# Patient Record
Sex: MALE | URBAN - NONMETROPOLITAN AREA
[De-identification: names, ages, dates, MRNs, and addresses within clinical notes are randomized per-mention and may not be internally consistent; named-entity substitution may affect disease eponyms.]

---

## 2023-05-09 ENCOUNTER — APPOINTMENT (RX ONLY)
Dept: URBAN - NONMETROPOLITAN AREA CLINIC 12 | Facility: CLINIC | Age: 73
Setting detail: DERMATOLOGY
End: 2023-05-09

## 2023-05-09 DIAGNOSIS — D22 MELANOCYTIC NEVI: ICD-10-CM

## 2023-05-09 DIAGNOSIS — Z71.89 OTHER SPECIFIED COUNSELING: ICD-10-CM

## 2023-05-09 DIAGNOSIS — Z80.8 FAMILY HISTORY OF MALIGNANT NEOPLASM OF OTHER ORGANS OR SYSTEMS: ICD-10-CM

## 2023-05-09 DIAGNOSIS — L82.1 OTHER SEBORRHEIC KERATOSIS: ICD-10-CM

## 2023-05-09 DIAGNOSIS — L81.4 OTHER MELANIN HYPERPIGMENTATION: ICD-10-CM

## 2023-05-09 DIAGNOSIS — D18.0 HEMANGIOMA: ICD-10-CM

## 2023-05-09 DIAGNOSIS — L57.0 ACTINIC KERATOSIS: ICD-10-CM

## 2023-05-09 PROBLEM — D48.5 NEOPLASM OF UNCERTAIN BEHAVIOR OF SKIN: Status: ACTIVE | Noted: 2023-05-09

## 2023-05-09 PROBLEM — D22.9 MELANOCYTIC NEVI, UNSPECIFIED: Status: ACTIVE | Noted: 2023-05-09

## 2023-05-09 PROBLEM — D18.01 HEMANGIOMA OF SKIN AND SUBCUTANEOUS TISSUE: Status: ACTIVE | Noted: 2023-05-09

## 2023-05-09 PROCEDURE — 99203 OFFICE O/P NEW LOW 30 MIN: CPT | Mod: 25

## 2023-05-09 PROCEDURE — 17003 DESTRUCT PREMALG LES 2-14: CPT

## 2023-05-09 PROCEDURE — 11102 TANGNTL BX SKIN SINGLE LES: CPT

## 2023-05-09 PROCEDURE — ? COUNSELING

## 2023-05-09 PROCEDURE — ? MEDICATION COUNSELING

## 2023-05-09 PROCEDURE — ? OTHER

## 2023-05-09 PROCEDURE — ? LIQUID NITROGEN

## 2023-05-09 PROCEDURE — 17000 DESTRUCT PREMALG LESION: CPT | Mod: 59

## 2023-05-09 PROCEDURE — ? PRESCRIPTION

## 2023-05-09 PROCEDURE — ? BIOPSY BY SHAVE METHOD

## 2023-05-09 RX ORDER — TRETIONIN 0.5 MG/G
CREAM TOPICAL QHS
Qty: 45 | Refills: 2 | Status: ERX | COMMUNITY
Start: 2023-05-09

## 2023-05-09 RX ADMIN — TRETIONIN: 0.5 CREAM TOPICAL at 00:00

## 2023-05-09 ASSESSMENT — LOCATION DETAILED DESCRIPTION DERM
LOCATION DETAILED: RIGHT PROXIMAL RADIAL DORSAL FOREARM
LOCATION DETAILED: LEFT ANTERIOR PROXIMAL UPPER ARM
LOCATION DETAILED: LEFT PROXIMAL DORSAL FOREARM
LOCATION DETAILED: LEFT VENTRAL PROXIMAL FOREARM
LOCATION DETAILED: RIGHT DISTAL DORSAL FOREARM
LOCATION DETAILED: UPPER STERNUM
LOCATION DETAILED: RIGHT ANTERIOR PROXIMAL UPPER ARM
LOCATION DETAILED: LEFT INFERIOR CENTRAL MALAR CHEEK
LOCATION DETAILED: LEFT DORSAL WRIST
LOCATION DETAILED: RIGHT DISTAL RADIAL DORSAL FOREARM
LOCATION DETAILED: LEFT CENTRAL MALAR CHEEK
LOCATION DETAILED: RIGHT VENTRAL PROXIMAL FOREARM

## 2023-05-09 ASSESSMENT — LOCATION SIMPLE DESCRIPTION DERM
LOCATION SIMPLE: RIGHT FOREARM
LOCATION SIMPLE: LEFT FOREARM
LOCATION SIMPLE: CHEST
LOCATION SIMPLE: LEFT WRIST
LOCATION SIMPLE: LEFT FOREARM
LOCATION SIMPLE: LEFT UPPER ARM
LOCATION SIMPLE: RIGHT UPPER ARM
LOCATION SIMPLE: RIGHT FOREARM
LOCATION SIMPLE: LEFT CHEEK
LOCATION SIMPLE: LEFT CHEEK

## 2023-05-09 ASSESSMENT — LOCATION ZONE DERM
LOCATION ZONE: FACE
LOCATION ZONE: ARM
LOCATION ZONE: ARM
LOCATION ZONE: TRUNK
LOCATION ZONE: FACE

## 2023-05-09 NOTE — PROCEDURE: MEDICATION COUNSELING
Rinvoq Pregnancy And Lactation Text: Based on animal studies, Rinvoq may cause embryo-fetal harm when administered to pregnant women.  The medication should not be used in pregnancy.  Breastfeeding is not recommended during treatment and for 6 days after the last dose.
Opzelura Counseling:  I discussed with the patient the risks of Opzelura including but not limited to nasopharngitis, bronchitis, ear infection, eosinophila, hives, diarrhea, folliculitis, tonsillitis, and rhinorrhea.  Taken orally, this medication has been linked to serious infections; higher rate of mortality; malignancy and lymphoproliferative disorders; major adverse cardiovascular events; thrombosis; thrombocytopenia, anemia, and neutropenia; and lipid elevations.
Oxybutynin Pregnancy And Lactation Text: This medication is Pregnancy Category B and is considered safe during pregnancy. It is unknown if it is excreted in breast milk.
Terbinafine Pregnancy And Lactation Text: This medication is Pregnancy Category B and is considered safe during pregnancy. It is also excreted in breast milk and breast feeding isn't recommended.
Rhofade Pregnancy And Lactation Text: This medication has not been assigned a Pregnancy Risk Category. It is unknown if the medication is excreted in breast milk.
Olumiant Pregnancy And Lactation Text: Based on animal studies, Olumiant may cause embryo-fetal harm when administered to pregnant women.  The medication should not be used in pregnancy.  Breastfeeding is not recommended during treatment.
Quinolones Counseling:  I discussed with the patient the risks of fluoroquinolones including but not limited to GI upset, allergic reaction, drug rash, diarrhea, dizziness, photosensitivity, yeast infections, liver function test abnormalities, tendonitis/tendon rupture.
Azelaic Acid Counseling: Patient counseled that medicine may cause skin irritation and to avoid applying near the eyes.  In the event of skin irritation, the patient was advised to reduce the amount of the drug applied or use it less frequently.   The patient verbalized understanding of the proper use and possible adverse effects of azelaic acid.  All of the patient's questions and concerns were addressed.
Vtama Pregnancy And Lactation Text: It is unknown if this medication can cause problems during pregnancy and breastfeeding.
Tremfya Counseling: I discussed with the patient the risks of guselkumab including but not limited to immunosuppression, serious infections, worsening of inflammatory bowel disease and drug reactions.  The patient understands that monitoring is required including a PPD at baseline and must alert us or the primary physician if symptoms of infection or other concerning signs are noted.
Humira Counseling:  I discussed with the patient the risks of adalimumab including but not limited to myelosuppression, immunosuppression, autoimmune hepatitis, demyelinating diseases, lymphoma, and serious infections.  The patient understands that monitoring is required including a PPD at baseline and must alert us or the primary physician if symptoms of infection or other concerning signs are noted.
Hydroquinone Pregnancy And Lactation Text: This medication has not been assigned a Pregnancy Risk Category but animal studies failed to show danger with the topical medication. It is unknown if the medication is excreted in breast milk.
Nsaids Counseling: NSAID Counseling: I discussed with the patient that NSAIDs should be taken with food. Prolonged use of NSAIDs can result in the development of stomach ulcers.  Patient advised to stop taking NSAIDs if abdominal pain occurs.  The patient verbalized understanding of the proper use and possible adverse effects of NSAIDs.  All of the patient's questions and concerns were addressed.
Spironolactone Counseling: Patient advised regarding risks of diarrhea, abdominal pain, hyperkalemia, birth defects (for female patients), liver toxicity and renal toxicity. The patient may need blood work to monitor liver and kidney function and potassium levels while on therapy. The patient verbalized understanding of the proper use and possible adverse effects of spironolactone.  All of the patient's questions and concerns were addressed.
Fluconazole Counseling:  Patient counseled regarding adverse effects of fluconazole including but not limited to headache, diarrhea, nausea, upset stomach, liver function test abnormalities, taste disturbance, and stomach pain.  There is a rare possibility of liver failure that can occur when taking fluconazole.  The patient understands that monitoring of LFTs and kidney function test may be required, especially at baseline. The patient verbalized understanding of the proper use and possible adverse effects of fluconazole.  All of the patient's questions and concerns were addressed.
Odomzo Counseling- I discussed with the patient the risks of Odomzo including but not limited to nausea, vomiting, diarrhea, constipation, weight loss, changes in the sense of taste, decreased appetite, muscle spasms, and hair loss.  The patient verbalized understanding of the proper use and possible adverse effects of Odomzo.  All of the patient's questions and concerns were addressed.
Topical Steroids Counseling: I discussed with the patient that prolonged use of topical steroids can result in the increased appearance of superficial blood vessels (telangiectasias), lightening (hypopigmentation) and thinning of the skin (atrophy).  Patient understands to avoid using high potency steroids in skin folds, the groin or the face.  The patient verbalized understanding of the proper use and possible adverse effects of topical steroids.  All of the patient's questions and concerns were addressed.
Clindamycin Pregnancy And Lactation Text: This medication can be used in pregnancy if certain situations. Clindamycin is also present in breast milk.
Siliq Pregnancy And Lactation Text: The risk during pregnancy and breastfeeding is uncertain with this medication.
Glycopyrrolate Counseling:  I discussed with the patient the risks of glycopyrrolate including but not limited to skin rash, drowsiness, dry mouth, difficulty urinating, and blurred vision.
Spironolactone Pregnancy And Lactation Text: This medication can cause feminization of the male fetus and should be avoided during pregnancy. The active metabolite is also found in breast milk.
Imiquimod Counseling:  I discussed with the patient the risks of imiquimod including but not limited to erythema, scaling, itching, weeping, crusting, and pain.  Patient understands that the inflammatory response to imiquimod is variable from person to person and was educated regarded proper titration schedule.  If flu-like symptoms develop, patient knows to discontinue the medication and contact us.
Humira Pregnancy And Lactation Text: This medication is Pregnancy Category B and is considered safe during pregnancy. It is unknown if this medication is excreted in breast milk.
Topical Steroids Applications Pregnancy And Lactation Text: Most topical steroids are considered safe to use during pregnancy and lactation.  Any topical steroid applied to the breast or nipple should be washed off before breastfeeding.
Isotretinoin Counseling: Patient should get monthly blood tests, not donate blood, not drive at night if vision affected, not share medication, and not undergo elective surgery for 6 months after tx completed. Side effects reviewed, pt to contact office should one occur.
Arava Pregnancy And Lactation Text: This medication is Pregnancy Category X and is absolutely contraindicated during pregnancy. It is unknown if it is excreted in breast milk.
5-Fu Pregnancy And Lactation Text: This medication is Pregnancy Category X and contraindicated in pregnancy and in women who may become pregnant. It is unknown if this medication is excreted in breast milk.
Topical Clindamycin Counseling: Patient counseled that this medication may cause skin irritation or allergic reactions.  In the event of skin irritation, the patient was advised to reduce the amount of the drug applied or use it less frequently.   The patient verbalized understanding of the proper use and possible adverse effects of clindamycin.  All of the patient's questions and concerns were addressed.
Albendazole Pregnancy And Lactation Text: This medication is Pregnancy Category C and it isn't known if it is safe during pregnancy. It is also excreted in breast milk.
Cimzia Pregnancy And Lactation Text: This medication crosses the placenta but can be considered safe in certain situations. Cimzia may be excreted in breast milk.
Valtrex Counseling: I discussed with the patient the risks of valacyclovir including but not limited to kidney damage, nausea, vomiting and severe allergy.  The patient understands that if the infection seems to be worsening or is not improving, they are to call.
Cyclosporine Pregnancy And Lactation Text: This medication is Pregnancy Category C and it isn't know if it is safe during pregnancy. This medication is excreted in breast milk.
Solaraze Counseling:  I discussed with the patient the risks of Solaraze including but not limited to erythema, scaling, itching, weeping, crusting, and pain.
Quinolones Pregnancy And Lactation Text: This medication is Pregnancy Category C and it isn't know if it is safe during pregnancy. It is also excreted in breast milk.
Clofazimine Counseling:  I discussed with the patient the risks of clofazimine including but not limited to skin and eye pigmentation, liver damage, nausea/vomiting, gastrointestinal bleeding and allergy.
Valtrex Pregnancy And Lactation Text: this medication is Pregnancy Category B and is considered safe during pregnancy. This medication is not directly found in breast milk but it's metabolite acyclovir is present.
Opzelura Pregnancy And Lactation Text: There is insufficient data to evaluate drug-associated risk for major birth defects, miscarriage, or other adverse maternal or fetal outcomes.  There is a pregnancy registry that monitors pregnancy outcomes in pregnant persons exposed to the medication during pregnancy.  It is unknown if this medication is excreted in breast milk.  Do not breastfeed during treatment and for about 4 weeks after the last dose.
Doxycycline Counseling:  Patient counseled regarding possible photosensitivity and increased risk for sunburn.  Patient instructed to avoid sunlight, if possible.  When exposed to sunlight, patients should wear protective clothing, sunglasses, and sunscreen.  The patient was instructed to call the office immediately if the following severe adverse effects occur:  hearing changes, easy bruising/bleeding, severe headache, or vision changes.  The patient verbalized understanding of the proper use and possible adverse effects of doxycycline.  All of the patient's questions and concerns were addressed.
Nsaids Pregnancy And Lactation Text: These medications are considered safe up to 30 weeks gestation. It is excreted in breast milk.
Zoryve Counseling:  I discussed with the patient that Zoryve is not for use in the eyes, mouth or vagina. The most commonly reported side effects include diarrhea, headache, insomnia, application site pain, upper respiratory tract infections, and urinary tract infections.  All of the patient's questions and concerns were addressed.
Sotyktu Counseling:  I discussed the most common side effects of Sotyktu including: common cold, sore throat, sinus infections, cold sores, canker sores, folliculitis, and acne.? I also discussed more serious side effects of Sotyktu including but not limited to: serious allergic reactions; increased risk for infections such as TB; cancers such as lymphomas; rhabdomyolysis and elevated CPK; and elevated triglycerides and liver enzymes.?
Simponi Counseling:  I discussed with the patient the risks of golimumab including but not limited to myelosuppression, immunosuppression, autoimmune hepatitis, demyelinating diseases, lymphoma, and serious infections.  The patient understands that monitoring is required including a PPD at baseline and must alert us or the primary physician if symptoms of infection or other concerning signs are noted.
Azelaic Acid Pregnancy And Lactation Text: This medication is considered safe during pregnancy and breast feeding.
Propranolol Counseling:  I discussed with the patient the risks of propranolol including but not limited to low heart rate, low blood pressure, low blood sugar, restlessness and increased cold sensitivity. They should call the office if they experience any of these side effects.
Imiquimod Pregnancy And Lactation Text: This medication is Pregnancy Category C. It is unknown if this medication is excreted in breast milk.
Ilumya Counseling: I discussed with the patient the risks of tildrakizumab including but not limited to immunosuppression, malignancy, posterior leukoencephalopathy syndrome, and serious infections.  The patient understands that monitoring is required including a PPD at baseline and must alert us or the primary physician if symptoms of infection or other concerning signs are noted.
Isotretinoin Pregnancy And Lactation Text: This medication is Pregnancy Category X and is considered extremely dangerous during pregnancy. It is unknown if it is excreted in breast milk.
Detail Level: Simple
Griseofulvin Counseling:  I discussed with the patient the risks of griseofulvin including but not limited to photosensitivity, cytopenia, liver damage, nausea/vomiting and severe allergy.  The patient understands that this medication is best absorbed when taken with a fatty meal (e.g., ice cream or french fries).
Olanzapine Counseling- I discussed with the patient the common side effects of olanzapine including but are not limited to: lack of energy, dry mouth, increased appetite, sleepiness, tremor, constipation, dizziness, changes in behavior, or restlessness.  Explained that teenagers are more likely to experience headaches, abdominal pain, pain in the arms or legs, tiredness, and sleepiness.  Serious side effects include but are not limited: increased risk of death in elderly patients who are confused, have memory loss, or dementia-related psychosis; hyperglycemia; increased cholesterol and triglycerides; and weight gain.
Cosentyx Counseling:  I discussed with the patient the risks of Cosentyx including but not limited to worsening of Crohn's disease, immunosuppression, allergic reactions and infections.  The patient understands that monitoring is required including a PPD at baseline and must alert us or the primary physician if symptoms of infection or other concerning signs are noted.
Azithromycin Counseling:  I discussed with the patient the risks of azithromycin including but not limited to GI upset, allergic reaction, drug rash, diarrhea, and yeast infections.
Ivermectin Counseling:  Patient instructed to take medication on an empty stomach with a full glass of water.  Patient informed of potential adverse effects including but not limited to nausea, diarrhea, dizziness, itching, and swelling of the extremities or lymph nodes.  The patient verbalized understanding of the proper use and possible adverse effects of ivermectin.  All of the patient's questions and concerns were addressed.
Methotrexate Counseling:  Patient counseled regarding adverse effects of methotrexate including but not limited to nausea, vomiting, abnormalities in liver function tests. Patients may develop mouth sores, rash, diarrhea, and abnormalities in blood counts. The patient understands that monitoring is required including LFT's and blood counts.  There is a rare possibility of scarring of the liver and lung problems that can occur when taking methotrexate. Persistent nausea, loss of appetite, pale stools, dark urine, cough, and shortness of breath should be reported immediately. Patient advised to discontinue methotrexate treatment at least three months before attempting to become pregnant.  I discussed the need for folate supplements while taking methotrexate.  These supplements can decrease side effects during methotrexate treatment. The patient verbalized understanding of the proper use and possible adverse effects of methotrexate.  All of the patient's questions and concerns were addressed.
Cimetidine Counseling:  I discussed with the patient the risks of Cimetidine including but not limited to gynecomastia, headache, diarrhea, nausea, drowsiness, arrhythmias, pancreatitis, skin rashes, psychosis, bone marrow suppression and kidney toxicity.
Drysol Counseling:  I discussed with the patient the risks of drysol/aluminum chloride including but not limited to skin rash, itching, irritation, burning.
Glycopyrrolate Pregnancy And Lactation Text: This medication is Pregnancy Category B and is considered safe during pregnancy. It is unknown if it is excreted breast milk.
Use Enhanced Medication Counseling?: No
Rifampin Counseling: I discussed with the patient the risks of rifampin including but not limited to liver damage, kidney damage, red-orange body fluids, nausea/vomiting and severe allergy.
Azathioprine Counseling:  I discussed with the patient the risks of azathioprine including but not limited to myelosuppression, immunosuppression, hepatotoxicity, lymphoma, and infections.  The patient understands that monitoring is required including baseline LFTs, Creatinine, possible TPMP genotyping and weekly CBCs for the first month and then every 2 weeks thereafter.  The patient verbalized understanding of the proper use and possible adverse effects of azathioprine.  All of the patient's questions and concerns were addressed.
Clofazimine Pregnancy And Lactation Text: This medication is Pregnancy Category C and isn't considered safe during pregnancy. It is excreted in breast milk.
Dutasteride Male Counseling: Dustasteride Counseling:  I discussed with the patient the risks of use of dutasteride including but not limited to decreased libido, decreased ejaculate volume, and gynecomastia. Women who can become pregnant should not handle medication.  All of the patient's questions and concerns were addressed.
Benzoyl Peroxide Counseling: Patient counseled that medicine may cause skin irritation and bleach clothing.  In the event of skin irritation, the patient was advised to reduce the amount of the drug applied or use it less frequently.   The patient verbalized understanding of the proper use and possible adverse effects of benzoyl peroxide.  All of the patient's questions and concerns were addressed.
Doxycycline Pregnancy And Lactation Text: This medication is Pregnancy Category D and not consider safe during pregnancy. It is also excreted in breast milk but is considered safe for shorter treatment courses.
Sotyktu Pregnancy And Lactation Text: There is insufficient data to evaluate whether or not Sotyktu is safe to use during pregnancy.? ?It is not known if Sotyktu passes into breast milk and whether or not it is safe to use when breastfeeding.??
Picato Counseling:  I discussed with the patient the risks of Picato including but not limited to erythema, scaling, itching, weeping, crusting, and pain.
Topical Sulfur Applications Counseling: Topical Sulfur Counseling: Patient counseled that this medication may cause skin irritation or allergic reactions.  In the event of skin irritation, the patient was advised to reduce the amount of the drug applied or use it less frequently.   The patient verbalized understanding of the proper use and possible adverse effects of topical sulfur application.  All of the patient's questions and concerns were addressed.
Xolair Counseling:  Patient informed of potential adverse effects including but not limited to fever, muscle aches, rash and allergic reactions.  The patient verbalized understanding of the proper use and possible adverse effects of Xolair.  All of the patient's questions and concerns were addressed.
Solaraze Pregnancy And Lactation Text: This medication is Pregnancy Category B and is considered safe. There is some data to suggest avoiding during the third trimester. It is unknown if this medication is excreted in breast milk.
Propranolol Pregnancy And Lactation Text: This medication is Pregnancy Category C and it isn't known if it is safe during pregnancy. It is excreted in breast milk.
Topical Sulfur Applications Pregnancy And Lactation Text: This medication is Pregnancy Category C and has an unknown safety profile during pregnancy. It is unknown if this topical medication is excreted in breast milk.
Erythromycin Counseling:  I discussed with the patient the risks of erythromycin including but not limited to GI upset, allergic reaction, drug rash, diarrhea, increase in liver enzymes, and yeast infections.
High Dose Vitamin A Counseling: Side effects reviewed, pt to contact office should one occur.
SSKI Counseling:  I discussed with the patient the risks of SSKI including but not limited to thyroid abnormalities, metallic taste, GI upset, fever, headache, acne, arthralgias, paraesthesias, lymphadenopathy, easy bleeding, arrhythmias, and allergic reaction.
Griseofulvin Pregnancy And Lactation Text: This medication is Pregnancy Category X and is known to cause serious birth defects. It is unknown if this medication is excreted in breast milk but breast feeding should be avoided.
Methotrexate Pregnancy And Lactation Text: This medication is Pregnancy Category X and is known to cause fetal harm. This medication is excreted in breast milk.
Hydroxychloroquine Counseling:  I discussed with the patient that a baseline ophthalmologic exam is needed at the start of therapy and every year thereafter while on therapy. A CBC may also be warranted for monitoring.  The side effects of this medication were discussed with the patient, including but not limited to agranulocytosis, aplastic anemia, seizures, rashes, retinopathy, and liver toxicity. Patient instructed to call the office should any adverse effect occur.  The patient verbalized understanding of the proper use and possible adverse effects of Plaquenil.  All the patient's questions and concerns were addressed.
Olanzapine Pregnancy And Lactation Text: This medication is pregnancy category C.   There are no adequate and well controlled trials with olanzapine in pregnant females.  Olanzapine should be used during pregnancy only if the potential benefit justifies the potential risk to the fetus.   In a study in lactating healthy women, olanzapine was excreted in breast milk.  It is recommended that women taking olanzapine should not breast feed.
Azithromycin Pregnancy And Lactation Text: This medication is considered safe during pregnancy and is also secreted in breast milk.
Klisyri Counseling:  I discussed with the patient the risks of Klisyri including but not limited to erythema, scaling, itching, weeping, crusting, and pain.
Prednisone Counseling:  I discussed with the patient the risks of prolonged use of prednisone including but not limited to weight gain, insomnia, osteoporosis, mood changes, diabetes, susceptibility to infection, glaucoma and high blood pressure.  In cases where prednisone use is prolonged, patients should be monitored with blood pressure checks, serum glucose levels and an eye exam.  Additionally, the patient may need to be placed on GI prophylaxis, PCP prophylaxis, and calcium and vitamin D supplementation and/or a bisphosphonate.  The patient verbalized understanding of the proper use and the possible adverse effects of prednisone.  All of the patient's questions and concerns were addressed.
Colchicine Counseling:  Patient counseled regarding adverse effects including but not limited to stomach upset (nausea, vomiting, stomach pain, or diarrhea).  Patient instructed to limit alcohol consumption while taking this medication.  Colchicine may reduce blood counts especially with prolonged use.  The patient understands that monitoring of kidney function and blood counts may be required, especially at baseline. The patient verbalized understanding of the proper use and possible adverse effects of colchicine.  All of the patient's questions and concerns were addressed.
Xolair Pregnancy And Lactation Text: This medication is Pregnancy Category B and is considered safe during pregnancy. This medication is excreted in breast milk.
Dutasteride Pregnancy And Lactation Text: This medication is absolutely contraindicated in women, especially during pregnancy and breast feeding. Feminization of male fetuses is possible if taking while pregnant.
Elidel Counseling: Patient may experience a mild burning sensation during topical application. Elidel is not approved in children less than 2 years of age. There have been case reports of hematologic and skin malignancies in patients using topical calcineurin inhibitors although causality is questionable.
Dupixent Counseling: I discussed with the patient the risks of dupilumab including but not limited to eye infection and irritation, cold sores, injection site reactions, worsening of asthma, allergic reactions and increased risk of parasitic infection.  Live vaccines should be avoided while taking dupilumab. Dupilumab will also interact with certain medications such as warfarin and cyclosporine. The patient understands that monitoring is required and they must alert us or the primary physician if symptoms of infection or other concerning signs are noted.
Bactrim Counseling:  I discussed with the patient the risks of sulfa antibiotics including but not limited to GI upset, allergic reaction, drug rash, diarrhea, dizziness, photosensitivity, and yeast infections.  Rarely, more serious reactions can occur including but not limited to aplastic anemia, agranulocytosis, methemoglobinemia, blood dyscrasias, liver or kidney failure, lung infiltrates or desquamative/blistering drug rashes.
Azathioprine Pregnancy And Lactation Text: This medication is Pregnancy Category D and isn't considered safe during pregnancy. It is unknown if this medication is excreted in breast milk.
Benzoyl Peroxide Pregnancy And Lactation Text: This medication is Pregnancy Category C. It is unknown if benzoyl peroxide is excreted in breast milk.
Zyclara Counseling:  I discussed with the patient the risks of imiquimod including but not limited to erythema, scaling, itching, weeping, crusting, and pain.  Patient understands that the inflammatory response to imiquimod is variable from person to person and was educated regarded proper titration schedule.  If flu-like symptoms develop, patient knows to discontinue the medication and contact us.
Xeljanz Counseling: I discussed with the patient the risks of Xeljanz therapy including increased risk of infection, liver issues, headache, diarrhea, or cold symptoms. Live vaccines should be avoided. They were instructed to call if they have any problems.
Rifampin Pregnancy And Lactation Text: This medication is Pregnancy Category C and it isn't know if it is safe during pregnancy. It is also excreted in breast milk and should not be used if you are breast feeding.
Skyrizi Counseling: I discussed with the patient the risks of risankizumab-rzaa including but not limited to immunosuppression, and serious infections.  The patient understands that monitoring is required including a PPD at baseline and must alert us or the primary physician if symptoms of infection or other concerning signs are noted.
Soolantra Counseling: I discussed with the patients the risks of topial Soolantra. This is a medicine which decreases the number of mites and inflammation in the skin. You experience burning, stinging, eye irritation or allergic reactions.  Please call our office if you develop any problems from using this medication.
Wartpeel Counseling:  I discussed with the patient the risks of Wartpeel including but not limited to erythema, scaling, itching, weeping, crusting, and pain.
Itraconazole Counseling:  I discussed with the patient the risks of itraconazole including but not limited to liver damage, nausea/vomiting, neuropathy, and severe allergy.  The patient understands that this medication is best absorbed when taken with acidic beverages such as non-diet cola or ginger ale.  The patient understands that monitoring is required including baseline LFTs and repeat LFTs at intervals.  The patient understands that they are to contact us or the primary physician if concerning signs are noted.
Xelclevelandz Pregnancy And Lactation Text: This medication is Pregnancy Category D and is not considered safe during pregnancy.  The risk during breast feeding is also uncertain.
Protopic Counseling: Patient may experience a mild burning sensation during topical application. Protopic is not approved in children less than 2 years of age. There have been case reports of hematologic and skin malignancies in patients using topical calcineurin inhibitors although causality is questionable.
Oral Minoxidil Counseling- I discussed with the patient the risks of oral minoxidil including but not limited to shortness of breath, swelling of the feet or ankles, dizziness, lightheadedness, unwanted hair growth and allergic reaction.  The patient verbalized understanding of the proper use and possible adverse effects of oral minoxidil.  All of the patient's questions and concerns were addressed.
Sarecycline Counseling: Patient advised regarding possible photosensitivity and discoloration of the teeth, skin, lips, tongue and gums.  Patient instructed to avoid sunlight, if possible.  When exposed to sunlight, patients should wear protective clothing, sunglasses, and sunscreen.  The patient was instructed to call the office immediately if the following severe adverse effects occur:  hearing changes, easy bruising/bleeding, severe headache, or vision changes.  The patient verbalized understanding of the proper use and possible adverse effects of sarecycline.  All of the patient's questions and concerns were addressed.
Soolantra Pregnancy And Lactation Text: This medication is Pregnancy Category C. This medication is considered safe during breast feeding.
Carac Counseling:  I discussed with the patient the risks of Carac including but not limited to erythema, scaling, itching, weeping, crusting, and pain.
Doxepin Counseling:  Patient advised that the medication is sedating and not to drive a car after taking this medication. Patient informed of potential adverse effects including but not limited to dry mouth, urinary retention, and blurry vision.  The patient verbalized understanding of the proper use and possible adverse effects of doxepin.  All of the patient's questions and concerns were addressed.
Infliximab Counseling:  I discussed with the patient the risks of infliximab including but not limited to myelosuppression, immunosuppression, autoimmune hepatitis, demyelinating diseases, lymphoma, and serious infections.  The patient understands that monitoring is required including a PPD at baseline and must alert us or the primary physician if symptoms of infection or other concerning signs are noted.
Hydroxychloroquine Pregnancy And Lactation Text: This medication has been shown to cause fetal harm but it isn't assigned a Pregnancy Risk Category. There are small amounts excreted in breast milk.
High Dose Vitamin A Pregnancy And Lactation Text: High dose vitamin A therapy is contraindicated during pregnancy and breast feeding.
Klisyri Pregnancy And Lactation Text: It is unknown if this medication can harm a developing fetus or if it is excreted in breast milk.
Erythromycin Pregnancy And Lactation Text: This medication is Pregnancy Category B and is considered safe during pregnancy. It is also excreted in breast milk.
Low Dose Naltrexone Counseling- I discussed with the patient the potential risks and side effects of low dose naltrexone including but not limited to: more vivid dreams, headaches, nausea, vomiting, abdominal pain, fatigue, dizziness, and anxiety.
Finasteride Male Counseling: Finasteride Counseling:  I discussed with the patient the risks of use of finasteride including but not limited to decreased libido, decreased ejaculate volume, gynecomastia, and depression. Women should not handle medication.  All of the patient's questions and concerns were addressed.
Dupixent Pregnancy And Lactation Text: This medication likely crosses the placenta but the risk for the fetus is uncertain. This medication is excreted in breast milk.
Topical Ketoconazole Counseling: Patient counseled that this medication may cause skin irritation or allergic reactions.  In the event of skin irritation, the patient was advised to reduce the amount of the drug applied or use it less frequently.   The patient verbalized understanding of the proper use and possible adverse effects of ketoconazole.  All of the patient's questions and concerns were addressed.
Doxepin Pregnancy And Lactation Text: This medication is Pregnancy Category C and it isn't known if it is safe during pregnancy. It is also excreted in breast milk and breast feeding isn't recommended.
Bactrim Pregnancy And Lactation Text: This medication is Pregnancy Category D and is known to cause fetal risk.  It is also excreted in breast milk.
Sski Pregnancy And Lactation Text: This medication is Pregnancy Category D and isn't considered safe during pregnancy. It is excreted in breast milk.
Cellcept Counseling:  I discussed with the patient the risks of mycophenolate mofetil including but not limited to infection/immunosuppression, GI upset, hypokalemia, hypercholesterolemia, bone marrow suppression, lymphoproliferative disorders, malignancy, GI ulceration/bleed/perforation, colitis, interstitial lung disease, kidney failure, progressive multifocal leukoencephalopathy, and birth defects.  The patient understands that monitoring is required including a baseline creatinine and regular CBC testing. In addition, patient must alert us immediately if symptoms of infection or other concerning signs are noted.
Thalidomide Counseling: I discussed with the patient the risks of thalidomide including but not limited to birth defects, anxiety, weakness, chest pain, dizziness, cough and severe allergy.
Protopic Pregnancy And Lactation Text: This medication is Pregnancy Category C. It is unknown if this medication is excreted in breast milk when applied topically.
Stelara Counseling:  I discussed with the patient the risks of ustekinumab including but not limited to immunosuppression, malignancy, posterior leukoencephalopathy syndrome, and serious infections.  The patient understands that monitoring is required including a PPD at baseline and must alert us or the primary physician if symptoms of infection or other concerning signs are noted.
Sarecycline Pregnancy And Lactation Text: This medication is Pregnancy Category D and not consider safe during pregnancy. It is also excreted in breast milk.
Oral Minoxidil Pregnancy And Lactation Text: This medication should only be used when clearly needed if you are pregnant, attempting to become pregnant or breast feeding.
Erivedge Counseling- I discussed with the patient the risks of Erivedge including but not limited to nausea, vomiting, diarrhea, constipation, weight loss, changes in the sense of taste, decreased appetite, muscle spasms, and hair loss.  The patient verbalized understanding of the proper use and possible adverse effects of Erivedge.  All of the patient's questions and concerns were addressed.
Minoxidil Counseling: Minoxidil is a topical medication which can increase blood flow where it is applied. It is uncertain how this medication increases hair growth. Side effects are uncommon and include stinging and allergic reactions.
Metronidazole Counseling:  I discussed with the patient the risks of metronidazole including but not limited to seizures, nausea/vomiting, a metallic taste in the mouth, nausea/vomiting and severe allergy.
Acitretin Counseling:  I discussed with the patient the risks of acitretin including but not limited to hair loss, dry lips/skin/eyes, liver damage, hyperlipidemia, depression/suicidal ideation, photosensitivity.  Serious rare side effects can include but are not limited to pancreatitis, pseudotumor cerebri, bony changes, clot formation/stroke/heart attack.  Patient understands that alcohol is contraindicated since it can result in liver toxicity and significantly prolong the elimination of the drug by many years.
Opioid Counseling: I discussed with the patient the potential side effects of opioids including but not limited to addiction, altered mental status, and depression. I stressed avoiding alcohol, benzodiazepines, muscle relaxants and sleep aids unless specifically okayed by a physician. The patient verbalized understanding of the proper use and possible adverse effects of opioids. All of the patient's questions and concerns were addressed. They were instructed to flush the remaining pills down the toilet if they did not need them for pain.
Cephalexin Counseling: I counseled the patient regarding use of cephalexin as an antibiotic for prophylactic and/or therapeutic purposes. Cephalexin (commonly prescribed under brand name Keflex) is a cephalosporin antibiotic which is active against numerous classes of bacteria, including most skin bacteria. Side effects may include nausea, diarrhea, gastrointestinal upset, rash, hives, yeast infections, and in rare cases, hepatitis, kidney disease, seizures, fever, confusion, neurologic symptoms, and others. Patients with severe allergies to penicillin medications are cautioned that there is about a 10% incidence of cross-reactivity with cephalosporins. When possible, patients with penicillin allergies should use alternatives to cephalosporins for antibiotic therapy.
Rituxan Counseling:  I discussed with the patient the risks of Rituxan infusions. Side effects can include infusion reactions, severe drug rashes including mucocutaneous reactions, reactivation of latent hepatitis and other infections and rarely progressive multifocal leukoencephalopathy.  All of the patient's questions and concerns were addressed.
Hydroxyzine Counseling: Patient advised that the medication is sedating and not to drive a car after taking this medication.  Patient informed of potential adverse effects including but not limited to dry mouth, urinary retention, and blurry vision.  The patient verbalized understanding of the proper use and possible adverse effects of hydroxyzine.  All of the patient's questions and concerns were addressed.
Low Dose Naltrexone Pregnancy And Lactation Text: Naltrexone is pregnancy category C.  There have been no adequate and well-controlled studies in pregnant women.  It should be used in pregnancy only if the potential benefit justifies the potential risk to the fetus.   Limited data indicates that naltrexone is minimally excreted into breastmilk.
Topical Retinoid counseling:  Patient advised to apply a pea-sized amount only at bedtime and wait 30 minutes after washing their face before applying.  If too drying, patient may add a non-comedogenic moisturizer. The patient verbalized understanding of the proper use and possible adverse effects of retinoids.  All of the patient's questions and concerns were addressed.
Eucrisa Counseling: Patient may experience a mild burning sensation during topical application. Eucrisa is not approved in children less than 2 years of age.
Finasteride Pregnancy And Lactation Text: This medication is absolutely contraindicated during pregnancy. It is unknown if it is excreted in breast milk.
Dapsone Counseling: I discussed with the patient the risks of dapsone including but not limited to hemolytic anemia, agranulocytosis, rashes, methemoglobinemia, kidney failure, peripheral neuropathy, headaches, GI upset, and liver toxicity.  Patients who start dapsone require monitoring including baseline LFTs and weekly CBCs for the first month, then every month thereafter.  The patient verbalized understanding of the proper use and possible adverse effects of dapsone.  All of the patient's questions and concerns were addressed.
Tetracycline Counseling: Patient counseled regarding possible photosensitivity and increased risk for sunburn.  Patient instructed to avoid sunlight, if possible.  When exposed to sunlight, patients should wear protective clothing, sunglasses, and sunscreen.  The patient was instructed to call the office immediately if the following severe adverse effects occur:  hearing changes, easy bruising/bleeding, severe headache, or vision changes.  The patient verbalized understanding of the proper use and possible adverse effects of tetracycline.  All of the patient's questions and concerns were addressed. Patient understands to avoid pregnancy while on therapy due to potential birth defects.
Cyclophosphamide Counseling:  I discussed with the patient the risks of cyclophosphamide including but not limited to hair loss, hormonal abnormalities, decreased fertility, abdominal pain, diarrhea, nausea and vomiting, bone marrow suppression and infection. The patient understands that monitoring is required while taking this medication.
Dapsone Pregnancy And Lactation Text: This medication is Pregnancy Category C and is not considered safe during pregnancy or breast feeding.
Adbry Counseling: I discussed with the patient the risks of tralokinumab including but not limited to eye infection and irritation, cold sores, injection site reactions, worsening of asthma, allergic reactions and increased risk of parasitic infection.  Live vaccines should be avoided while taking tralokinumab. The patient understands that monitoring is required and they must alert us or the primary physician if symptoms of infection or other concerning signs are noted.
Metronidazole Pregnancy And Lactation Text: This medication is Pregnancy Category B and considered safe during pregnancy.  It is also excreted in breast milk.
Otezla Counseling: The side effects of Otezla were discussed with the patient, including but not limited to worsening or new depression, weight loss, diarrhea, nausea, upper respiratory tract infection, and headache. Patient instructed to call the office should any adverse effect occur.  The patient verbalized understanding of the proper use and possible adverse effects of Otezla.  All the patient's questions and concerns were addressed.
Ketoconazole Counseling:   Patient counseled regarding improving absorption with orange juice.  Adverse effects include but are not limited to breast enlargement, headache, diarrhea, nausea, upset stomach, liver function test abnormalities, taste disturbance, and stomach pain.  There is a rare possibility of liver failure that can occur when taking ketoconazole. The patient understands that monitoring of LFTs may be required, especially at baseline. The patient verbalized understanding of the proper use and possible adverse effects of ketoconazole.  All of the patient's questions and concerns were addressed.
Qbrexza Counseling:  I discussed with the patient the risks of Qbrexza including but not limited to headache, mydriasis, blurred vision, dry eyes, nasal dryness, dry mouth, dry throat, dry skin, urinary hesitation, and constipation.  Local skin reactions including erythema, burning, stinging, and itching can also occur.
Winlevi Counseling:  I discussed with the patient the risks of topical clascoterone including but not limited to erythema, scaling, itching, and stinging. Patient voiced their understanding.
Calcipotriene Counseling:  I discussed with the patient the risks of calcipotriene including but not limited to erythema, scaling, itching, and irritation.
Cibinqo Counseling: I discussed with the patient the risks of Cibinqo therapy including but not limited to common cold, nausea, headache, cold sores, increased blood CPK levels, dizziness, UTIs, fatigue, acne, and vomitting. Live vaccines should be avoided.  This medication has been linked to serious infections; higher rate of mortality; malignancy and lymphoproliferative disorders; major adverse cardiovascular events; thrombosis; thrombocytopenia and lymphopenia; lipid elevations; and retinal detachment.
Libtayo Counseling- I discussed with the patient the risks of Libtayo including but not limited to nausea, vomiting, diarrhea, and bone or muscle pain.  The patient verbalized understanding of the proper use and possible adverse effects of Libtayo.  All of the patient's questions and concerns were addressed.
Hydroxyzine Pregnancy And Lactation Text: This medication is not safe during pregnancy and should not be taken. It is also excreted in breast milk and breast feeding isn't recommended.
Enbrel Counseling:  I discussed with the patient the risks of etanercept including but not limited to myelosuppression, immunosuppression, autoimmune hepatitis, demyelinating diseases, lymphoma, and infections.  The patient understands that monitoring is required including a PPD at baseline and must alert us or the primary physician if symptoms of infection or other concerning signs are noted.
Calcipotriene Pregnancy And Lactation Text: The use of this medication during pregnancy or lactation is not recommended as there is insufficient data.
Topical Metronidazole Counseling: Metronidazole is a topical antibiotic medication. You may experience burning, stinging, redness, or allergic reactions.  Please call our office if you develop any problems from using this medication.
Cephalexin Pregnancy And Lactation Text: This medication is Pregnancy Category B and considered safe during pregnancy.  It is also excreted in breast milk but can be used safely for shorter doses.
Rituxan Pregnancy And Lactation Text: This medication is Pregnancy Category C and it isn't know if it is safe during pregnancy. It is unknown if this medication is excreted in breast milk but similar antibodies are known to be excreted.
Otezla Pregnancy And Lactation Text: This medication is Pregnancy Category C and it isn't known if it is safe during pregnancy. It is unknown if it is excreted in breast milk.
Mirvaso Counseling: Mirvaso is a topical medication which can decrease superficial blood flow where applied. Side effects are uncommon and include stinging, redness and allergic reactions.
Ketoconazole Pregnancy And Lactation Text: This medication is Pregnancy Category C and it isn't know if it is safe during pregnancy. It is also excreted in breast milk and breast feeding isn't recommended.
Acitretin Pregnancy And Lactation Text: This medication is Pregnancy Category X and should not be given to women who are pregnant or may become pregnant in the future. This medication is excreted in breast milk.
Niacinamide Counseling: I recommended taking niacin or niacinamide, also know as vitamin B3, twice daily. Recent evidence suggests that taking vitamin B3 (500 mg twice daily) can reduce the risk of actinic keratoses and non-melanoma skin cancers. Side effects of vitamin B3 include flushing and headache.
Birth Control Pills Counseling: Birth Control Pill Counseling: I discussed with the patient the potential side effects of OCPs including but not limited to increased risk of stroke, heart attack, thrombophlebitis, deep venous thrombosis, hepatic adenomas, breast changes, GI upset, headaches, and depression.  The patient verbalized understanding of the proper use and possible adverse effects of OCPs. All of the patient's questions and concerns were addressed.
Opioid Pregnancy And Lactation Text: These medications can lead to premature delivery and should be avoided during pregnancy. These medications are also present in breast milk in small amounts.
Cyclophosphamide Pregnancy And Lactation Text: This medication is Pregnancy Category D and it isn't considered safe during pregnancy. This medication is excreted in breast milk.
Cantharidin Counseling:  I discussed with the patient the risks of Cantharidin including but not limited to pain, redness, burning, itching, and blistering.
Hydroquinone Counseling:  Patient advised that medication may result in skin irritation, lightening (hypopigmentation), dryness, and burning.  In the event of skin irritation, the patient was advised to reduce the amount of the drug applied or use it less frequently.  Rarely, spots that are treated with hydroquinone can become darker (pseudoochronosis).  Should this occur, patient instructed to stop medication and call the office. The patient verbalized understanding of the proper use and possible adverse effects of hydroquinone.  All of the patient's questions and concerns were addressed.
Adbry Pregnancy And Lactation Text: It is unknown if this medication will adversely affect pregnancy or breast feeding.
Cibinqo Pregnancy And Lactation Text: It is unknown if this medication will adversely affect pregnancy or breast feeding.  You should not take this medication if you are currently pregnant or planning a pregnancy or while breastfeeding.
Aklief counseling:  Patient advised to apply a pea-sized amount only at bedtime and wait 30 minutes after washing their face before applying.  If too drying, patient may add a non-comedogenic moisturizer.  The most commonly reported side effects including irritation, redness, scaling, dryness, stinging, burning, itching, and increased risk of sunburn.  The patient verbalized understanding of the proper use and possible adverse effects of retinoids.  All of the patient's questions and concerns were addressed.
Taltz Counseling: I discussed with the patient the risks of ixekizumab including but not limited to immunosuppression, serious infections, worsening of inflammatory bowel disease and drug reactions.  The patient understands that monitoring is required including a PPD at baseline and must alert us or the primary physician if symptoms of infection or other concerning signs are noted.
Minocycline Counseling: Patient advised regarding possible photosensitivity and discoloration of the teeth, skin, lips, tongue and gums.  Patient instructed to avoid sunlight, if possible.  When exposed to sunlight, patients should wear protective clothing, sunglasses, and sunscreen.  The patient was instructed to call the office immediately if the following severe adverse effects occur:  hearing changes, easy bruising/bleeding, severe headache, or vision changes.  The patient verbalized understanding of the proper use and possible adverse effects of minocycline.  All of the patient's questions and concerns were addressed.
Winlevi Pregnancy And Lactation Text: This medication is considered safe during pregnancy and breastfeeding.
Qbrexza Pregnancy And Lactation Text: There is no available data on Qbrexza use in pregnant women.  There is no available data on Qbrexza use in lactation.
Tranexamic Acid Counseling:  Patient advised of the small risk of bleeding problems with tranexamic acid. They were also instructed to call if they developed any nausea, vomiting or diarrhea. All of the patient's questions and concerns were addressed.
Tazorac Counseling:  Patient advised that medication is irritating and drying.  Patient may need to apply sparingly and wash off after an hour before eventually leaving it on overnight.  The patient verbalized understanding of the proper use and possible adverse effects of tazorac.  All of the patient's questions and concerns were addressed.
Siliq Counseling:  I discussed with the patient the risks of Siliq including but not limited to new or worsening depression, suicidal thoughts and behavior, immunosuppression, malignancy, posterior leukoencephalopathy syndrome, and serious infections.  The patient understands that monitoring is required including a PPD at baseline and must alert us or the primary physician if symptoms of infection or other concerning signs are noted. There is also a special program designed to monitor depression which is required with Siliq.
VTAMA Counseling: I discussed with the patient that VTAMA is not for use in the eyes, mouth or mouth. They should call the office if they develop any signs of allergic reactions to VTAMA. The patient verbalized understanding of the proper use and possible adverse effects of VTAMA.  All of the patient's questions and concerns were addressed.
Aklief Pregnancy And Lactation Text: It is unknown if this medication is safe to use during pregnancy.  It is unknown if this medication is excreted in breast milk.  Breastfeeding women should use the topical cream on the smallest area of the skin for the shortest time needed while breastfeeding.  Do not apply to nipple and areola.
Olumiant Counseling: I discussed with the patient the risks of Olumiant therapy including but not limited to upper respiratory tract infections, shingles, cold sores, and nausea. Live vaccines should be avoided.  This medication has been linked to serious infections; higher rate of mortality; malignancy and lymphoproliferative disorders; major adverse cardiovascular events; thrombosis; gastrointestinal perforations; neutropenia; lymphopenia; anemia; liver enzyme elevations; and lipid elevations.
Oxybutynin Counseling:  I discussed with the patient the risks of oxybutynin including but not limited to skin rash, drowsiness, dry mouth, difficulty urinating, and blurred vision.
Rinvoq Counseling: I discussed with the patient the risks of Rinvoq therapy including but not limited to upper respiratory tract infections, shingles, cold sores, bronchitis, nausea, cough, fever, acne, and headache. Live vaccines should be avoided.  This medication has been linked to serious infections; higher rate of mortality; malignancy and lymphoproliferative disorders; major adverse cardiovascular events; thrombosis; thrombocytopenia, anemia, and neutropenia; lipid elevations; liver enzyme elevations; and gastrointestinal perforations.
Terbinafine Counseling: Patient counseling regarding adverse effects of terbinafine including but not limited to headache, diarrhea, rash, upset stomach, liver function test abnormalities, itching, taste/smell disturbance, nausea, abdominal pain, and flatulence.  There is a rare possibility of liver failure that can occur when taking terbinafine.  The patient understands that a baseline LFT and kidney function test may be required. The patient verbalized understanding of the proper use and possible adverse effects of terbinafine.  All of the patient's questions and concerns were addressed.
Rhofade Counseling: Rhofade is a topical medication which can decrease superficial blood flow where applied. Side effects are uncommon and include stinging, redness and allergic reactions.
Libtayo Pregnancy And Lactation Text: This medication is contraindicated in pregnancy and when breast feeding.
Gabapentin Counseling: I discussed with the patient the risks of gabapentin including but not limited to dizziness, somnolence, fatigue and ataxia.
Birth Control Pills Pregnancy And Lactation Text: This medication should be avoided if pregnant and for the first 30 days post-partum.
Cantharidin Pregnancy And Lactation Text: This medication has not been proven safe during pregnancy. It is unknown if this medication is excreted in breast milk.
Niacinamide Pregnancy And Lactation Text: These medications are considered safe during pregnancy.
Clindamycin Counseling: I counseled the patient regarding use of clindamycin as an antibiotic for prophylactic and/or therapeutic purposes. Clindamycin is active against numerous classes of bacteria, including skin bacteria. Side effects may include nausea, diarrhea, gastrointestinal upset, rash, hives, yeast infections, and in rare cases, colitis.
Bexarotene Counseling:  I discussed with the patient the risks of bexarotene including but not limited to hair loss, dry lips/skin/eyes, liver abnormalities, hyperlipidemia, pancreatitis, depression/suicidal ideation, photosensitivity, drug rash/allergic reactions, hypothyroidism, anemia, leukopenia, infection, cataracts, and teratogenicity.  Patient understands that they will need regular blood tests to check lipid profile, liver function tests, white blood cell count, thyroid function tests and pregnancy test if applicable.
Topical Metronidazole Pregnancy And Lactation Text: This medication is Pregnancy Category B and considered safe during pregnancy.  It is also considered safe to use while breastfeeding.
5-Fu Counseling: 5-Fluorouracil Counseling:  I discussed with the patient the risks of 5-fluorouracil including but not limited to erythema, scaling, itching, weeping, crusting, and pain.
Bexarotene Pregnancy And Lactation Text: This medication is Pregnancy Category X and should not be given to women who are pregnant or may become pregnant. This medication should not be used if you are breast feeding.
Arava Counseling:  Patient counseled regarding adverse effects of Arava including but not limited to nausea, vomiting, abnormalities in liver function tests. Patients may develop mouth sores, rash, diarrhea, and abnormalities in blood counts. The patient understands that monitoring is required including LFTs and blood counts.  There is a rare possibility of scarring of the liver and lung problems that can occur when taking methotrexate. Persistent nausea, loss of appetite, pale stools, dark urine, cough, and shortness of breath should be reported immediately. Patient advised to discontinue Arava treatment and consult with a physician prior to attempting conception. The patient will have to undergo a treatment to eliminate Arava from the body prior to conception.
Tranexamic Acid Pregnancy And Lactation Text: It is unknown if this medication is safe during pregnancy or breast feeding.
Albendazole Counseling:  I discussed with the patient the risks of albendazole including but not limited to cytopenia, kidney damage, nausea/vomiting and severe allergy.  The patient understands that this medication is being used in an off-label manner.
Tazorac Pregnancy And Lactation Text: This medication is not safe during pregnancy. It is unknown if this medication is excreted in breast milk.
Cimzia Counseling:  I discussed with the patient the risks of Cimzia including but not limited to immunosuppression, allergic reactions and infections.  The patient understands that monitoring is required including a PPD at baseline and must alert us or the primary physician if symptoms of infection or other concerning signs are noted.
Cyclosporine Counseling:  I discussed with the patient the risks of cyclosporine including but not limited to hypertension, gingival hyperplasia,myelosuppression, immunosuppression, liver damage, kidney damage, neurotoxicity, lymphoma, and serious infections. The patient understands that monitoring is required including baseline blood pressure, CBC, CMP, lipid panel and uric acid, and then 1-2 times monthly CMP and blood pressure.

## 2023-05-09 NOTE — PROCEDURE: OTHER
Render Risk Assessment In Note?: yes
Note Text (......Xxx Chief Complaint.): This diagnosis correlates with the
Other (Free Text): Due to moderate to severe actinic damage,recommend field therapy. Discussed both levulan and efudex. Pt opt for levulan due to healing time. Pretreat with levulan 2 weeks to face and 3 weeks to arms before tx.
Detail Level: Detailed

## 2023-05-09 NOTE — PROCEDURE: LIQUID NITROGEN
Consent: The patient's consent was obtained including but not limited to risks of crusting, scabbing, blistering, scarring, darker or lighter pigmentary change, recurrence, incomplete removal and infection.
Duration Of Freeze Thaw-Cycle (Seconds): 1
Number Of Freeze-Thaw Cycles: 2 freeze-thaw cycles
Show Aperture Variable?: Yes
Render Note In Bullet Format When Appropriate: No
Post-Care Instructions: I reviewed with the patient in detail post-care instructions. Patient is to wear sunprotection, and avoid picking at any of the treated lesions. Pt may apply Vaseline to crusted or scabbing areas.
Detail Level: Detailed

## 2023-05-09 NOTE — PROCEDURE: MIPS QUALITY
Quality 265: Biopsy Follow-Up: Biopsy results reviewed, communicated, tracked, and documented
Detail Level: Detailed
Quality 226: Preventive Care And Screening: Tobacco Use: Screening And Cessation Intervention: Patient screened for tobacco use and is an ex/non-smoker
Quality 130: Documentation Of Current Medications In The Medical Record: Current Medications Documented
Quality 110: Preventive Care And Screening: Influenza Immunization: Influenza Immunization Administered during Influenza season
Quality 111:Pneumonia Vaccination Status For Older Adults: Pneumococcal vaccine administered on or after patient’s 60th birthday and before the end of the measurement period

## 2023-06-15 ENCOUNTER — APPOINTMENT (RX ONLY)
Dept: URBAN - NONMETROPOLITAN AREA CLINIC 12 | Facility: CLINIC | Age: 73
Setting detail: DERMATOLOGY
End: 2023-06-15

## 2023-06-15 DIAGNOSIS — Z71.89 OTHER SPECIFIED COUNSELING: ICD-10-CM

## 2023-06-15 DIAGNOSIS — L57.0 ACTINIC KERATOSIS: ICD-10-CM

## 2023-06-15 PROCEDURE — ? PHOTODYNAMIC THERAPY COUNSELING

## 2023-06-15 PROCEDURE — ? PDT: BLUE

## 2023-06-15 PROCEDURE — ? ORDER FOR PHOTODYNAMIC THERAPY

## 2023-06-15 PROCEDURE — 99212 OFFICE O/P EST SF 10 MIN: CPT | Mod: 25

## 2023-06-15 PROCEDURE — ? COUNSELING

## 2023-06-15 PROCEDURE — 96574 DBRDMT PRMLG LES W/PDT: CPT

## 2023-06-15 ASSESSMENT — LOCATION SIMPLE DESCRIPTION DERM
LOCATION SIMPLE: LEFT CHEEK
LOCATION SIMPLE: LEFT FOREARM
LOCATION SIMPLE: RIGHT FOREARM

## 2023-06-15 ASSESSMENT — LOCATION ZONE DERM
LOCATION ZONE: FACE
LOCATION ZONE: ARM

## 2023-06-15 ASSESSMENT — LOCATION DETAILED DESCRIPTION DERM
LOCATION DETAILED: RIGHT PROXIMAL DORSAL FOREARM
LOCATION DETAILED: LEFT DISTAL DORSAL FOREARM
LOCATION DETAILED: LEFT CENTRAL MALAR CHEEK

## 2023-06-15 NOTE — PROCEDURE: PDT: BLUE
Who Performed The Pdt (Provider): Katie Chavez
Total Number Of Aks Treated (Optional To Report): 0
Incubation Start Time: 10:15
Incubation End Time: 12:15
Lot # (Optional): [id number]
Medical Necessity: Precancerous Lesions
Debridement Text (Will Only Render In Visit Note If You Select Debridement Option Under Who Performed The Pdt Field): Prior to application of the photodynamic medication the hyperkeratotic lesions were curetted to make them more amenable to therapy.
Who Performed The Pdt?: Performed by MD DENNY, NITA or JUDI with Pre-Procedure Debridement of Hyperkeratotic Lesions ([id number])
History Of Hsv?: Yes
Light Source: 415nm
Pre-Procedure Text: The treatment areas were cleaned and prepped in the usual fashion. the area was curretted prior to levulan application
Which Photosensitizer Was Used: Levulan
Show Anesthesia In Plan?: No
Anesthesia Type: 1% lidocaine with epinephrine
Incubation Time: 02:00:00
Number Of Kerasticks/Tubes Billed For: 1
Consent: Written consent obtained.  The risks were reviewed with the patient including but not limited to: pigmentary changes, pain, blistering, scabbing, redness, and the remote possibility of scarring.
Post-Care Instructions: I reviewed with the patient in detail post-care instructions. Patient is to avoid sunlight for the next 2 days, and wear sun protection. Patients may expect sunburn like redness, discomfort and scabbing.
Illumination Time: 2 hours
Detail Level: Zone
Expiration Date (Optional): 6/26

## 2023-06-15 NOTE — HPI: PHOTODYNAMIC THERAPY (PDT)
Have You Had Previous Treatments With Pdt Before?: has not had previous treatments
When Outside In The Sun, Do You...: mostly burns, rarely tans
Additional History: Pre treated with retinol

## 2023-06-15 NOTE — PROCEDURE: MIPS QUALITY
Quality 226: Preventive Care And Screening: Tobacco Use: Screening And Cessation Intervention: Patient screened for tobacco use and is an ex/non-smoker
Quality 130: Documentation Of Current Medications In The Medical Record: Current Medications Documented
Quality 110: Preventive Care And Screening: Influenza Immunization: Influenza Immunization Administered during Influenza season
Detail Level: Detailed
Quality 265: Biopsy Follow-Up: Biopsy results reviewed, communicated, tracked, and documented

## 2023-06-15 NOTE — PROCEDURE: ORDER FOR PHOTODYNAMIC THERAPY
Consent: The procedure and risks were reviewed with the patient including but not limited to: burning, pigmentary changes, pain, blistering, scabbing, redness, and the possibility of needing numerous treatments. Strict photoprotection after the procedure was also discussed. Advised pt that lesions that do not clear with PDT will need to be followed closely. Follow up at one week for immediate post PDT and in three months for continued surveillance.
History Of Hsv (Optional): No
Pdt Type: Daylight
Location: Override
Debridement: Yes
Photosensitizer: Levulan
Detail Level: Simple
Face And Neck Incubation Time: 1 Hour
Scalp Incubation Time: 2 Hours
Face Incubation Time: 120 minutes
Face And Scalp Incubation Time: 1 Hour for the face and 2 Hours for the scalp
Frequency Of Pdt: Single Treatment
Location Override: Face, scalp, arms

## 2023-06-22 ENCOUNTER — APPOINTMENT (RX ONLY)
Dept: URBAN - NONMETROPOLITAN AREA CLINIC 12 | Facility: CLINIC | Age: 73
Setting detail: DERMATOLOGY
End: 2023-06-22

## 2023-06-22 DIAGNOSIS — Z71.89 OTHER SPECIFIED COUNSELING: ICD-10-CM

## 2023-06-22 DIAGNOSIS — L57.0 ACTINIC KERATOSIS: ICD-10-CM

## 2023-06-22 PROCEDURE — ? COUNSELING

## 2023-06-22 PROCEDURE — 99213 OFFICE O/P EST LOW 20 MIN: CPT

## 2023-06-22 NOTE — PROCEDURE: MIPS QUALITY
Detail Level: Detailed
Quality 111:Pneumonia Vaccination Status For Older Adults: Pneumococcal vaccine administered on or after patient’s 60th birthday and before the end of the measurement period
Quality 110: Preventive Care And Screening: Influenza Immunization: Influenza Immunization Administered during Influenza season
Quality 130: Documentation Of Current Medications In The Medical Record: Current Medications Documented
Quality 226: Preventive Care And Screening: Tobacco Use: Screening And Cessation Intervention: Patient screened for tobacco use and is an ex/non-smoker

## 2023-09-27 ENCOUNTER — APPOINTMENT (RX ONLY)
Dept: URBAN - NONMETROPOLITAN AREA CLINIC 12 | Facility: CLINIC | Age: 73
Setting detail: DERMATOLOGY
End: 2023-09-27

## 2023-09-27 DIAGNOSIS — L81.4 OTHER MELANIN HYPERPIGMENTATION: ICD-10-CM

## 2023-09-27 DIAGNOSIS — D22 MELANOCYTIC NEVI: ICD-10-CM

## 2023-09-27 DIAGNOSIS — L57.0 ACTINIC KERATOSIS: ICD-10-CM | Status: IMPROVED

## 2023-09-27 DIAGNOSIS — L82.1 OTHER SEBORRHEIC KERATOSIS: ICD-10-CM

## 2023-09-27 DIAGNOSIS — Z71.89 OTHER SPECIFIED COUNSELING: ICD-10-CM

## 2023-09-27 PROBLEM — D22.9 MELANOCYTIC NEVI, UNSPECIFIED: Status: ACTIVE | Noted: 2023-09-27

## 2023-09-27 PROCEDURE — ? LIQUID NITROGEN

## 2023-09-27 PROCEDURE — ? COUNSELING

## 2023-09-27 PROCEDURE — 99213 OFFICE O/P EST LOW 20 MIN: CPT | Mod: 25

## 2023-09-27 PROCEDURE — 17003 DESTRUCT PREMALG LES 2-14: CPT

## 2023-09-27 PROCEDURE — 17000 DESTRUCT PREMALG LESION: CPT

## 2023-09-27 ASSESSMENT — LOCATION SIMPLE DESCRIPTION DERM
LOCATION SIMPLE: LEFT FOREHEAD
LOCATION SIMPLE: RIGHT EYEBROW
LOCATION SIMPLE: RIGHT FOREARM

## 2023-09-27 ASSESSMENT — LOCATION DETAILED DESCRIPTION DERM
LOCATION DETAILED: RIGHT PROXIMAL RADIAL DORSAL FOREARM
LOCATION DETAILED: LEFT SUPERIOR MEDIAL FOREHEAD
LOCATION DETAILED: LEFT FOREHEAD
LOCATION DETAILED: RIGHT LATERAL EYEBROW

## 2023-09-27 ASSESSMENT — LOCATION ZONE DERM
LOCATION ZONE: ARM
LOCATION ZONE: FACE

## 2023-09-27 NOTE — PROCEDURE: LIQUID NITROGEN
Consent: The patient's consent was obtained including but not limited to risks of crusting, scabbing, blistering, scarring, darker or lighter pigmentary change, recurrence, incomplete removal and infection.
Show Applicator Variable?: Yes
Duration Of Freeze Thaw-Cycle (Seconds): 1
Detail Level: Detailed
Number Of Freeze-Thaw Cycles: 2 freeze-thaw cycles
Post-Care Instructions: I reviewed with the patient in detail post-care instructions. Patient is to wear sunprotection, and avoid picking at any of the treated lesions. Pt may apply Vaseline to crusted or scabbing areas.
Render Note In Bullet Format When Appropriate: No

## 2023-11-12 ENCOUNTER — HOSPITAL ENCOUNTER (OUTPATIENT)
Dept: RADIOLOGY | Facility: MEDICAL CENTER | Age: 73
End: 2023-11-12
Payer: MEDICARE

## 2023-11-12 ENCOUNTER — HOSPITAL ENCOUNTER (INPATIENT)
Facility: MEDICAL CENTER | Age: 73
LOS: 2 days | DRG: 189 | End: 2023-11-14
Attending: EMERGENCY MEDICINE | Admitting: HOSPITALIST
Payer: MEDICARE

## 2023-11-12 ENCOUNTER — APPOINTMENT (OUTPATIENT)
Dept: RADIOLOGY | Facility: MEDICAL CENTER | Age: 73
DRG: 189 | End: 2023-11-12
Attending: EMERGENCY MEDICINE
Payer: MEDICARE

## 2023-11-12 DIAGNOSIS — I10 PRIMARY HYPERTENSION: ICD-10-CM

## 2023-11-12 DIAGNOSIS — R07.9 CHEST PAIN, UNSPECIFIED TYPE: ICD-10-CM

## 2023-11-12 DIAGNOSIS — E78.5 DYSLIPIDEMIA: ICD-10-CM

## 2023-11-12 DIAGNOSIS — J43.9 PULMONARY EMPHYSEMA, UNSPECIFIED EMPHYSEMA TYPE (HCC): ICD-10-CM

## 2023-11-12 PROBLEM — J96.01 ACUTE RESPIRATORY FAILURE WITH HYPOXIA (HCC): Status: ACTIVE | Noted: 2023-11-12

## 2023-11-12 PROBLEM — M89.9 LESION OF BONE OF THORACIC SPINE: Status: ACTIVE | Noted: 2023-11-12

## 2023-11-12 LAB
ALBUMIN SERPL BCP-MCNC: 4.2 G/DL (ref 3.2–4.9)
ALBUMIN/GLOB SERPL: 1.4 G/DL
ALP SERPL-CCNC: 68 U/L (ref 30–99)
ALT SERPL-CCNC: 17 U/L (ref 2–50)
ANION GAP SERPL CALC-SCNC: 11 MMOL/L (ref 7–16)
AST SERPL-CCNC: 16 U/L (ref 12–45)
BASOPHILS # BLD AUTO: 0.4 % (ref 0–1.8)
BASOPHILS # BLD: 0.03 K/UL (ref 0–0.12)
BILIRUB SERPL-MCNC: 0.9 MG/DL (ref 0.1–1.5)
BUN SERPL-MCNC: 18 MG/DL (ref 8–22)
CALCIUM ALBUM COR SERPL-MCNC: 8.6 MG/DL (ref 8.5–10.5)
CALCIUM SERPL-MCNC: 8.8 MG/DL (ref 8.5–10.5)
CHLORIDE SERPL-SCNC: 107 MMOL/L (ref 96–112)
CO2 SERPL-SCNC: 21 MMOL/L (ref 20–33)
CREAT SERPL-MCNC: 0.7 MG/DL (ref 0.5–1.4)
EKG IMPRESSION: NORMAL
EKG IMPRESSION: NORMAL
EOSINOPHIL # BLD AUTO: 0.15 K/UL (ref 0–0.51)
EOSINOPHIL NFR BLD: 1.8 % (ref 0–6.9)
ERYTHROCYTE [DISTWIDTH] IN BLOOD BY AUTOMATED COUNT: 47 FL (ref 35.9–50)
FEV1 % PREDICTED: 59
FEV1/FVC % PREDICTED: 94
FEV1/FVC: 72.61 %
FEV1: 1.54 L
FVC % PREDICTED: 62
FVC (L): 2.12
GFR SERPLBLD CREATININE-BSD FMLA CKD-EPI: 97 ML/MIN/1.73 M 2
GLOBULIN SER CALC-MCNC: 2.9 G/DL (ref 1.9–3.5)
GLUCOSE SERPL-MCNC: 114 MG/DL (ref 65–99)
HCT VFR BLD AUTO: 48.5 % (ref 42–52)
HGB BLD-MCNC: 16.8 G/DL (ref 14–18)
IMM GRANULOCYTES # BLD AUTO: 0.02 K/UL (ref 0–0.11)
IMM GRANULOCYTES NFR BLD AUTO: 0.2 % (ref 0–0.9)
LACTATE SERPL-SCNC: 0.8 MMOL/L (ref 0.5–2)
LACTATE SERPL-SCNC: 1.2 MMOL/L (ref 0.5–2)
LIPASE SERPL-CCNC: 24 U/L (ref 11–82)
LYMPHOCYTES # BLD AUTO: 1.87 K/UL (ref 1–4.8)
LYMPHOCYTES NFR BLD: 22.6 % (ref 22–41)
MCH RBC QN AUTO: 34 PG (ref 27–33)
MCHC RBC AUTO-ENTMCNC: 34.6 G/DL (ref 32.3–36.5)
MCV RBC AUTO: 98.2 FL (ref 81.4–97.8)
MONOCYTES # BLD AUTO: 1.14 K/UL (ref 0–0.85)
MONOCYTES NFR BLD AUTO: 13.8 % (ref 0–13.4)
NEUTROPHILS # BLD AUTO: 5.08 K/UL (ref 1.82–7.42)
NEUTROPHILS NFR BLD: 61.2 % (ref 44–72)
NRBC # BLD AUTO: 0 K/UL
NRBC BLD-RTO: 0 /100 WBC (ref 0–0.2)
PLATELET # BLD AUTO: 171 K/UL (ref 164–446)
PMV BLD AUTO: 10.6 FL (ref 9–12.9)
POTASSIUM SERPL-SCNC: 4.7 MMOL/L (ref 3.6–5.5)
PROCALCITONIN SERPL-MCNC: 0.09 NG/ML
PROT SERPL-MCNC: 7.1 G/DL (ref 6–8.2)
RBC # BLD AUTO: 4.94 M/UL (ref 4.7–6.1)
SODIUM SERPL-SCNC: 139 MMOL/L (ref 135–145)
TROPONIN T SERPL-MCNC: 9 NG/L (ref 6–19)
WBC # BLD AUTO: 8.3 K/UL (ref 4.8–10.8)

## 2023-11-12 PROCEDURE — 94010 BREATHING CAPACITY TEST: CPT

## 2023-11-12 PROCEDURE — 99285 EMERGENCY DEPT VISIT HI MDM: CPT

## 2023-11-12 PROCEDURE — 84484 ASSAY OF TROPONIN QUANT: CPT

## 2023-11-12 PROCEDURE — 83605 ASSAY OF LACTIC ACID: CPT

## 2023-11-12 PROCEDURE — 770020 HCHG ROOM/CARE - TELE (206)

## 2023-11-12 PROCEDURE — 93005 ELECTROCARDIOGRAM TRACING: CPT

## 2023-11-12 PROCEDURE — 93005 ELECTROCARDIOGRAM TRACING: CPT | Performed by: EMERGENCY MEDICINE

## 2023-11-12 PROCEDURE — 80053 COMPREHEN METABOLIC PANEL: CPT

## 2023-11-12 PROCEDURE — 700111 HCHG RX REV CODE 636 W/ 250 OVERRIDE (IP): Mod: JZ | Performed by: HOSPITALIST

## 2023-11-12 PROCEDURE — 85025 COMPLETE CBC W/AUTO DIFF WBC: CPT

## 2023-11-12 PROCEDURE — 700102 HCHG RX REV CODE 250 W/ 637 OVERRIDE(OP): Performed by: HOSPITALIST

## 2023-11-12 PROCEDURE — 71275 CT ANGIOGRAPHY CHEST: CPT

## 2023-11-12 PROCEDURE — 99223 1ST HOSP IP/OBS HIGH 75: CPT | Mod: AI | Performed by: HOSPITALIST

## 2023-11-12 PROCEDURE — 83690 ASSAY OF LIPASE: CPT

## 2023-11-12 PROCEDURE — 96372 THER/PROPH/DIAG INJ SC/IM: CPT

## 2023-11-12 PROCEDURE — 700101 HCHG RX REV CODE 250: Performed by: HOSPITALIST

## 2023-11-12 PROCEDURE — A9270 NON-COVERED ITEM OR SERVICE: HCPCS | Performed by: HOSPITALIST

## 2023-11-12 PROCEDURE — 36415 COLL VENOUS BLD VENIPUNCTURE: CPT

## 2023-11-12 PROCEDURE — 84145 PROCALCITONIN (PCT): CPT

## 2023-11-12 PROCEDURE — 700117 HCHG RX CONTRAST REV CODE 255: Performed by: EMERGENCY MEDICINE

## 2023-11-12 PROCEDURE — 700111 HCHG RX REV CODE 636 W/ 250 OVERRIDE (IP): Performed by: EMERGENCY MEDICINE

## 2023-11-12 PROCEDURE — 94640 AIRWAY INHALATION TREATMENT: CPT

## 2023-11-12 RX ORDER — POLYETHYLENE GLYCOL 3350 17 G/17G
1 POWDER, FOR SOLUTION ORAL
Status: DISCONTINUED | OUTPATIENT
Start: 2023-11-12 | End: 2023-11-14 | Stop reason: HOSPADM

## 2023-11-12 RX ORDER — LISINOPRIL 10 MG/1
10 TABLET ORAL
Status: DISCONTINUED | OUTPATIENT
Start: 2023-11-12 | End: 2023-11-14 | Stop reason: HOSPADM

## 2023-11-12 RX ORDER — CHLORAL HYDRATE 500 MG
1000 CAPSULE ORAL
COMMUNITY

## 2023-11-12 RX ORDER — OXYCODONE HYDROCHLORIDE 10 MG/1
10 TABLET ORAL
Status: DISCONTINUED | OUTPATIENT
Start: 2023-11-12 | End: 2023-11-14 | Stop reason: HOSPADM

## 2023-11-12 RX ORDER — VITAMIN B COMPLEX
1000 TABLET ORAL DAILY
COMMUNITY

## 2023-11-12 RX ORDER — PRAVASTATIN SODIUM 40 MG
40 TABLET ORAL NIGHTLY
COMMUNITY

## 2023-11-12 RX ORDER — AMOXICILLIN 250 MG
2 CAPSULE ORAL 2 TIMES DAILY
Status: DISCONTINUED | OUTPATIENT
Start: 2023-11-12 | End: 2023-11-14 | Stop reason: HOSPADM

## 2023-11-12 RX ORDER — LISINOPRIL 40 MG/1
40 TABLET ORAL EVERY EVENING
Status: ON HOLD | COMMUNITY
End: 2023-11-14 | Stop reason: SDUPTHER

## 2023-11-12 RX ORDER — ONDANSETRON 2 MG/ML
4 INJECTION INTRAMUSCULAR; INTRAVENOUS EVERY 4 HOURS PRN
Status: DISCONTINUED | OUTPATIENT
Start: 2023-11-12 | End: 2023-11-14 | Stop reason: HOSPADM

## 2023-11-12 RX ORDER — ASPIRIN 300 MG/1
300 SUPPOSITORY RECTAL DAILY
Status: DISCONTINUED | OUTPATIENT
Start: 2023-11-13 | End: 2023-11-13

## 2023-11-12 RX ORDER — BISACODYL 10 MG
10 SUPPOSITORY, RECTAL RECTAL
Status: DISCONTINUED | OUTPATIENT
Start: 2023-11-12 | End: 2023-11-14 | Stop reason: HOSPADM

## 2023-11-12 RX ORDER — PREDNISONE 20 MG/1
20 TABLET ORAL DAILY
Status: DISCONTINUED | OUTPATIENT
Start: 2023-11-13 | End: 2023-11-14 | Stop reason: HOSPADM

## 2023-11-12 RX ORDER — MORPHINE SULFATE 4 MG/ML
4 INJECTION INTRAVENOUS
Status: DISCONTINUED | OUTPATIENT
Start: 2023-11-12 | End: 2023-11-14 | Stop reason: HOSPADM

## 2023-11-12 RX ORDER — ASPIRIN 325 MG
325 TABLET ORAL DAILY
Status: DISCONTINUED | OUTPATIENT
Start: 2023-11-13 | End: 2023-11-13

## 2023-11-12 RX ORDER — ONDANSETRON 4 MG/1
4 TABLET, ORALLY DISINTEGRATING ORAL EVERY 4 HOURS PRN
Status: DISCONTINUED | OUTPATIENT
Start: 2023-11-12 | End: 2023-11-14 | Stop reason: HOSPADM

## 2023-11-12 RX ORDER — OXYCODONE HYDROCHLORIDE 5 MG/1
5 TABLET ORAL
Status: DISCONTINUED | OUTPATIENT
Start: 2023-11-12 | End: 2023-11-14 | Stop reason: HOSPADM

## 2023-11-12 RX ORDER — IPRATROPIUM BROMIDE AND ALBUTEROL SULFATE 2.5; .5 MG/3ML; MG/3ML
3 SOLUTION RESPIRATORY (INHALATION)
Status: DISCONTINUED | OUTPATIENT
Start: 2023-11-12 | End: 2023-11-13

## 2023-11-12 RX ORDER — PREDNISONE 20 MG/1
60 TABLET ORAL DAILY
Status: COMPLETED | OUTPATIENT
Start: 2023-11-12 | End: 2023-11-12

## 2023-11-12 RX ORDER — ACETAMINOPHEN 325 MG/1
650 TABLET ORAL EVERY 6 HOURS PRN
Status: DISCONTINUED | OUTPATIENT
Start: 2023-11-12 | End: 2023-11-14 | Stop reason: HOSPADM

## 2023-11-12 RX ORDER — ASPIRIN 81 MG/1
81 TABLET, CHEWABLE ORAL DAILY
COMMUNITY

## 2023-11-12 RX ORDER — ASPIRIN 81 MG/1
324 TABLET, CHEWABLE ORAL DAILY
Status: DISCONTINUED | OUTPATIENT
Start: 2023-11-13 | End: 2023-11-13

## 2023-11-12 RX ORDER — ENOXAPARIN SODIUM 100 MG/ML
40 INJECTION SUBCUTANEOUS DAILY
Status: DISCONTINUED | OUTPATIENT
Start: 2023-11-12 | End: 2023-11-14 | Stop reason: HOSPADM

## 2023-11-12 RX ADMIN — IPRATROPIUM BROMIDE AND ALBUTEROL SULFATE 3 ML: 2.5; .5 SOLUTION RESPIRATORY (INHALATION) at 10:12

## 2023-11-12 RX ADMIN — IOHEXOL 67 ML: 350 INJECTION, SOLUTION INTRAVENOUS at 07:49

## 2023-11-12 RX ADMIN — IPRATROPIUM BROMIDE AND ALBUTEROL SULFATE 3 ML: 2.5; .5 SOLUTION RESPIRATORY (INHALATION) at 22:01

## 2023-11-12 RX ADMIN — IPRATROPIUM BROMIDE AND ALBUTEROL SULFATE 3 ML: 2.5; .5 SOLUTION RESPIRATORY (INHALATION) at 18:22

## 2023-11-12 RX ADMIN — DOCUSATE SODIUM 50 MG AND SENNOSIDES 8.6 MG 2 TABLET: 8.6; 5 TABLET, FILM COATED ORAL at 10:41

## 2023-11-12 RX ADMIN — IPRATROPIUM BROMIDE AND ALBUTEROL SULFATE 3 ML: 2.5; .5 SOLUTION RESPIRATORY (INHALATION) at 14:13

## 2023-11-12 RX ADMIN — LISINOPRIL 10 MG: 10 TABLET ORAL at 10:41

## 2023-11-12 RX ADMIN — ENOXAPARIN SODIUM 40 MG: 100 INJECTION SUBCUTANEOUS at 21:05

## 2023-11-12 RX ADMIN — PREDNISONE 60 MG: 20 TABLET ORAL at 09:59

## 2023-11-12 ASSESSMENT — PULMONARY FUNCTION TESTS: FEV1: 1.54

## 2023-11-12 ASSESSMENT — ENCOUNTER SYMPTOMS
SORE THROAT: 0
BACK PAIN: 0
PALPITATIONS: 0
CHILLS: 0
FEVER: 0
ABDOMINAL PAIN: 0
COUGH: 0
HEADACHES: 0
DOUBLE VISION: 0
BLURRED VISION: 0
SHORTNESS OF BREATH: 1
DIARRHEA: 0
VOMITING: 0
DIZZINESS: 0
NAUSEA: 0
LOSS OF CONSCIOUSNESS: 0

## 2023-11-12 ASSESSMENT — HEART SCORE
RISK FACTORS: 1-2 RISK FACTORS
ECG: NORMAL
HISTORY: SLIGHTLY SUSPICIOUS
AGE: 65+
HEART SCORE: 3
TROPONIN: LESS THAN OR EQUAL TO NORMAL LIMIT

## 2023-11-12 ASSESSMENT — PAIN DESCRIPTION - PAIN TYPE: TYPE: ACUTE PAIN

## 2023-11-12 ASSESSMENT — LIFESTYLE VARIABLES: EVER_SMOKED: YES

## 2023-11-12 ASSESSMENT — COPD QUESTIONNAIRES
DO YOU EVER COUGH UP ANY MUCUS OR PHLEGM?: YES, A FEW DAYS A WEEK OR MONTH
HAVE YOU SMOKED AT LEAST 100 CIGARETTES IN YOUR ENTIRE LIFE: YES
DURING THE PAST 4 WEEKS HOW MUCH DID YOU FEEL SHORT OF BREATH: SOME OF THE TIME
COPD SCREENING SCORE: 6

## 2023-11-12 NOTE — ED PROVIDER NOTES
ED Provider Note    CHIEF COMPLAINT  Chief Complaint   Patient presents with    Chest Pain     Pt states chest pain started 1 day ago on the L side and radiated towards the middle of the chest, pt was seen at [place] and transferred to [facility] for further evaluation     Other     Hypoxia-pt requiring 5 L NC to maintain oxygen saturations, pt typically is on room air       EXTERNAL RECORDS REVIEWED  Other reviewed the transfer notes from [facility]    HPI/ISRA Castillo is a 73 y.o. male who presents with chest pain.  The patient states 2 nights ago he developed chest pain while laying down.  He states he cannot get comfortable throughout the evening.  He did have associated shortness of breath.  Yesterday his symptoms persisted and he went to [facility].  The patient had negative cardiac markers but was found to be hypoxic and was transferred here for higher level of care.  The patient states he has no known history of ischemic heart disease.  He states that they attempted to do a cardiac stress test 3 years ago that was not effective as they could not get his heart rate up enough for the chemical stress test.  He did not have angiography.  From a cardiovascular standpoint the patient does have dyslipidemia but no other risk factors.  He has not had any pain or swelling to his lower extremities nor does he have any risk factors from a DVT standpoint.  The patient did receive aspirin, nitroglycerin, and fentanyl prior to arrival.  He did have response to the nitroglycerin and they are concerned for acute coronary syndrome the patient was transferred to [facility] for higher level of care.    PAST MEDICAL HISTORY       SURGICAL HISTORY  patient denies any surgical history    FAMILY HISTORY  No family history on file.    SOCIAL HISTORY  Social History     Tobacco Use    Smoking status: Not on file    Smokeless tobacco: Not on file   Substance and Sexual Activity    Alcohol use: Not  "on file    Drug use: Not on file    Sexual activity: Not on file       CURRENT MEDICATIONS  Home Medications    **Home medications have not yet been reviewed for this encounter**         ALLERGIES  No Known Allergies    PHYSICAL EXAM  VITAL SIGNS: BP (!) 150/73   Pulse (!) 55   Temp 36 °C (96.8 °F) (Oral)   Resp 18   Ht 1.702 m (5' 7\")   Wt 99.8 kg (220 lb)   SpO2 90%   BMI 34.46 kg/m²    In general the patient appears ill and is currently on supplemental oxygenation    HEENT otherwise unremarkable    Pulmonary the patient's lungs are symmetrically diminished throughout    Cardiovascular S1-S2 with a regular rate and rhythm    GI abdomen soft    Skin no rashes, pallor, no jaundice    Extremities no edema    Neurologic examination is grossly intact    DIAGNOSTIC STUDIES   Results for orders placed or performed during the hospital encounter of 11/12/23   CBC w/ Differential   Result Value Ref Range    WBC 8.3 4.8 - 10.8 K/uL    RBC 4.94 4.70 - 6.10 M/uL    Hemoglobin 16.8 14.0 - 18.0 g/dL    Hematocrit 48.5 42.0 - 52.0 %    MCV 98.2 (H) 81.4 - 97.8 fL    MCH 34.0 (H) 27.0 - 33.0 pg    MCHC 34.6 32.3 - 36.5 g/dL    RDW 47.0 35.9 - 50.0 fL    Platelet Count 171 164 - 446 K/uL    MPV 10.6 9.0 - 12.9 fL    Neutrophils-Polys 61.20 44.00 - 72.00 %    Lymphocytes 22.60 22.00 - 41.00 %    Monocytes 13.80 (H) 0.00 - 13.40 %    Eosinophils 1.80 0.00 - 6.90 %    Basophils 0.40 0.00 - 1.80 %    Immature Granulocytes 0.20 0.00 - 0.90 %    Nucleated RBC 0.00 0.00 - 0.20 /100 WBC    Neutrophils (Absolute) 5.08 1.82 - 7.42 K/uL    Lymphs (Absolute) 1.87 1.00 - 4.80 K/uL    Monos (Absolute) 1.14 (H) 0.00 - 0.85 K/uL    Eos (Absolute) 0.15 0.00 - 0.51 K/uL    Baso (Absolute) 0.03 0.00 - 0.12 K/uL    Immature Granulocytes (abs) 0.02 0.00 - 0.11 K/uL    NRBC (Absolute) 0.00 K/uL   Complete Metabolic Panel (CMP)   Result Value Ref Range    Sodium 139 135 - 145 mmol/L    Potassium 4.7 3.6 - 5.5 mmol/L    Chloride 107 96 - 112 mmol/L "    Co2 21 20 - 33 mmol/L    Anion Gap 11.0 7.0 - 16.0    Glucose 114 (H) 65 - 99 mg/dL    Bun 18 8 - 22 mg/dL    Creatinine 0.70 0.50 - 1.40 mg/dL    Calcium 8.8 8.5 - 10.5 mg/dL    Correct Calcium 8.6 8.5 - 10.5 mg/dL    AST(SGOT) 16 12 - 45 U/L    ALT(SGPT) 17 2 - 50 U/L    Alkaline Phosphatase 68 30 - 99 U/L    Total Bilirubin 0.9 0.1 - 1.5 mg/dL    Albumin 4.2 3.2 - 4.9 g/dL    Total Protein 7.1 6.0 - 8.2 g/dL    Globulin 2.9 1.9 - 3.5 g/dL    A-G Ratio 1.4 g/dL   Troponin - STAT Once   Result Value Ref Range    Troponin T 9 6 - 19 ng/L   Lipase   Result Value Ref Range    Lipase 24 11 - 82 U/L   LACTIC ACID   Result Value Ref Range    Lactic Acid 0.8 0.5 - 2.0 mmol/L   PROCALCITONIN   Result Value Ref Range    Procalcitonin 0.09 <0.25 ng/mL   ESTIMATED GFR   Result Value Ref Range    GFR (CKD-EPI) 97 >60 mL/min/1.73 m 2   EKG   Result Value Ref Range    Report       [facility] Emergency Dept.    Test Date:  2023  Pt Name:    RUSS CLEVELAND              Department: ER  MRN:        [id number]                      Room:       [facility]  Gender:     Male                         Technician: [id number]  :        [id number]                   Requested By:ER TRIAGE PROTOCOL  Order #:    [id number]                    Reading MD: MATT VALDERRAMA MD    Measurements  Intervals                                Axis  Rate:       57                           P:          43  AR:         186                          QRS:        7  QRSD:       147                          T:          64  QT:         461  QTc:        449    Interpretive Statements  twelve-lead EKG shows a normal sinus rhythm with a ventricular to 57, the  patient does have a left bundle branch block with J-point elevation  anteriorly but no reciprocal changes to support ischemia.  He does have T  wave inversion in aVL and there is no dynamic change from the EKG reviewed  from [place]   Electronically Signed On 2023 06:19:10 PST by MATT GOLDEN  MD JANNIE       EKG  I have independently interpreted this EKG  Please see my interpretation above    RADIOLOGY  CT-CTA CHEST PULMONARY ARTERY W/ RECONS   Final Result         1. No pulmonary.   2. Bibasilar airspace disease.   3. Cardiomegaly.   4. Sclerosis within the T9 vertebral body, which could represent metastatic disease, lymphoma, or other.   5. Emphysema.            OUTSIDE IMAGES-CT CHEST   Final Result      OUTSIDE IMAGES-DX CHEST   Final Result        HEART Score: 3      COURSE & MEDICAL DECISION MAKING    This a 73-year-old gentleman who presents as a transfer from [facility] with chest pain and suspected acute coronary syndrome.  The patient's troponin continues to be negative and his EKG does not show any ischemic change.  He does have a significant pleuritic component and a CT scan was ordered to rule out a pulmonary embolus as the patient is hypoxic.  This did not show any evidence of a pulmonary embolus but the patient does have evidence of COPD with some bibasilar airspace disease.  I do not suspect the patient has pneumonia as he has not had any recent fever nor cough.  I suspect this could be more of a COPD exacerbation and the patient will receive prednisone orally and will give the patient albuterol treatment.  He did have aspirin and nitroglycerin prior to arrival.  Again his cardiac markers continue to be negative which is comforting.  Unstable angina would still be in the differential.  However this should not cause his profound hypoxemia that is oxygen dependent.  Clinically does not appear to have heart failure.    The patient does have a sclerotic lesion in the T9 vertebral body and this could be from metastatic disease otherwise there is no other evidence of cancer.  This will require further work-up.    FINAL DIAGNOSIS  1.  Chest pain  2.  Dyspnea suspect COPD exacerbation  3.  Incidental sclerotic lesion in the T9 vertebral body    Disposition  The patient will be  admitted in stable condition             Electronically signed by: Miles Kapoor M.D., 11/12/2023 6:12 AM       difficulty decision making

## 2023-11-12 NOTE — ED NOTES
Ambulated to bathroom, pt states not dizzy/lightheaded on 3L O2, no balance issues observed, successful and appropriate walking mobility.

## 2023-11-12 NOTE — ED TRIAGE NOTES
Chief Complaint   Patient presents with    Chest Pain     Pt states chest pain started 1 day ago on the L side and radiated towards the middle of the chest, pt was seen at [place] and transferred to [facility] for further evaluation     Other     Hypoxia-pt requiring 5 L NC to maintain oxygen saturations, pt typically is on room air     PTA: pt received 324 ASA, 100 mcg fentanyl, Nitroglycerin    A+O x 4, answering questions appropriately, no acute distress

## 2023-11-12 NOTE — ED NOTES
Med Rec complete per pt  Allergies Reviewed    Pt reports not taking anticoagulant in the last 14 days

## 2023-11-12 NOTE — ED NOTES
(Break RN) Pt sitting up in chair, on 2L O2 by nasal cannula. Pt reports he continues to have chest pain when he breathes deep, not worsening/changing. Fall precautions in place & call bell in reach.

## 2023-11-12 NOTE — ASSESSMENT & PLAN NOTE
11/13/2023  Patient has a T9 sclerotic lesion concerning for neoplastic process  He does not have any previous history of neoplastic process  He has chronic back pain likely from a chronic L1 compression fracture.  No prior mid back pain  No need for emergent inpatient vertebral biopsy  Order IR vertebral biopsy as outpatient.

## 2023-11-12 NOTE — ASSESSMENT & PLAN NOTE
11/13/2023  Etiology uncertain at this point  Pain was improved with nitroglycerin though this is nonspecific  CTA of the chest negative for PE though did demonstrate a T9 sclerotic lesion.  It did not demonstrate any morphologic and findings or other findings which would indicate acute aortic disease though it was not a dedicated aortogram  Musculoskeletal related to sclerotic lesion versus cardiac ischemia versus aortic disease are most likely etiologies though certainly differential extends beyond this  EKG which I have personally reviewed demonstrates sinus rhythm with left bundle branch block.  No evidence of acute ischemia.  No change to comparison study from  [place]  Admit to telemetry  Serial troponins.  Flat  Cardiac echo  Stress imaging study in the a.m. shows nonreversible infarct.  Consulted cardiology  As needed support

## 2023-11-12 NOTE — ASSESSMENT & PLAN NOTE
11/13/2023  Patient does not carry any previous respiratory diagnoses though he does have a distant history of tobacco use, and was exposed to significant dust in his previous job as a .  CT is negative for PE, but does demonstrate changes consistent with emphysema.  85% on room air on presentation  Some wheezing on exam  Check spirometry.  Showing restrictive pattern  Start systemic steroids and scheduled bronchodilators  O2 and RT protocols  Cardiac echo

## 2023-11-12 NOTE — ED NOTES
Bedside report received from prior RN. Pt resting. Resp normal/unlabored on 3L O2. Bed side rails up/in low position.

## 2023-11-12 NOTE — ED NOTES
Update regarding Stress Test scheduled, ETA tomorrow d/t pt receiving nitro at [facility], pt given breakfast w/out coffee

## 2023-11-12 NOTE — ED NOTES
Ambulated to bathroom with 6L oxymask, pt states not dizzy/lightheaded, no balance issues observed, successful and appropriate walking mobility.

## 2023-11-12 NOTE — H&P
"Hospital Medicine History & Physical Note    Date of Service  11/12/2023    Primary Care Physician  Pcp Pt States None    Consultants      Specialist Names:     Code Status  Full Code    Chief Complaint  Chief Complaint   Patient presents with    Chest Pain     Pt states chest pain started 1 day ago on the L side and radiated towards the middle of the chest, pt was seen at [place] and transferred to [facility] for further evaluation     Other     Hypoxia-pt requiring 5 L NC to maintain oxygen saturations, pt typically is on room air       History of Presenting Illness  Jovany Castillo is a 73 y.o. male who presented 11/12/2023 with previous history of dyslipidemia, hypertension, and obesity.  He also has a history of smoking, but quit smoking 40 years ago.  He was employed as a  and is now retired.  He does not drink    Patient lives in [place] and presented to the hospital there with complaint of chest pain.  Chest pain began yesterday, with no history of trauma.  He reports pain that initially was on the left shoulder, but has since spread to involve the left chest and to the center of the chest.  Pain is worse when he takes a deep breath and he states that he has been \"panting\".  He is otherwise unsure of anything that makes pain worse.  It is made better with nitroglycerin, which she was given in [place], and with morphine which she was given in transit.    In [place] patient was evaluated with chest x-ray lab and EKG.  EKG shows left bundle branch, but no acute ischemic changes.  Troponin was negative.  Labs relatively unremarkable.  Chest x-ray interpreted as negative.  Patient was however hypoxic requiring 3 L to maintain saturations in the low 90s.  He was 85% on room air on presentation.    Here patient has had repeat work-up with the addition of CTA chest.  CT has been negative for PE, but did demonstrate some emphysematous changes, and a T9 sclerotic lesion which is worrisome for neoplastic process.  " Repeat EKG is stable.    I discussed the plan of care with patient.    Review of Systems  Review of Systems   Constitutional:  Negative for chills and fever.   HENT:  Negative for nosebleeds and sore throat.    Eyes:  Negative for blurred vision and double vision.   Respiratory:  Positive for shortness of breath. Negative for cough.    Cardiovascular:  Positive for chest pain. Negative for palpitations and leg swelling.   Gastrointestinal:  Negative for abdominal pain, diarrhea, nausea and vomiting.   Genitourinary:  Negative for dysuria and urgency.   Musculoskeletal:  Negative for back pain.   Skin:  Negative for rash.   Neurological:  Negative for dizziness, loss of consciousness and headaches.       Past Medical History   has no past medical history on file.    Surgical History   has no past surgical history on file.     Family History  family history is not on file.   Family history reviewed with patient. There is no family history that is pertinent to the chief complaint.     Social History   reports that he has never smoked. He has never been exposed to tobacco smoke. He has never used smokeless tobacco. He reports that he does not use drugs.    Allergies  No Known Allergies    Medications  None       Physical Exam  Temp:  [36 °C (96.8 °F)] 36 °C (96.8 °F)  Pulse:  [50-55] 55  Resp:  [15-20] 18  BP: (127-150)/(63-78) 136/78  SpO2:  [90 %-92 %] 91 %  Blood Pressure : 136/78   Temperature: 36 °C (96.8 °F)   Pulse: (!) 55   Respiration: 18   Pulse Oximetry: 91 %       Physical Exam  Constitutional:       General: He is not in acute distress.     Appearance: He is well-developed. He is obese. He is not diaphoretic.   HENT:      Head: Normocephalic and atraumatic.   Eyes:      Conjunctiva/sclera: Conjunctivae normal.   Neck:      Vascular: No JVD.   Cardiovascular:      Rate and Rhythm: Normal rate.      Heart sounds: No murmur heard.     No gallop.   Pulmonary:      Effort: Pulmonary effort is normal. No  "respiratory distress.      Breath sounds: No stridor. Wheezing present. No rales.   Abdominal:      Palpations: Abdomen is soft.      Tenderness: There is no abdominal tenderness. There is no guarding or rebound.   Musculoskeletal:         General: No tenderness.      Right lower leg: No edema.      Left lower leg: No edema.   Skin:     General: Skin is warm and dry.      Capillary Refill: Capillary refill takes less than 2 seconds.      Findings: No rash.   Neurological:      Mental Status: He is alert and oriented to person, place, and time.   Psychiatric:         Mood and Affect: Mood normal.         Behavior: Behavior normal.         Thought Content: Thought content normal.         Laboratory:  Recent Labs     11/12/23  0609   WBC 8.3   RBC 4.94   HEMOGLOBIN 16.8   HEMATOCRIT 48.5   MCV 98.2*   MCH 34.0*   MCHC 34.6   RDW 47.0   PLATELETCT 171   MPV 10.6     Recent Labs     11/12/23  0609   SODIUM 139   POTASSIUM 4.7   CHLORIDE 107   CO2 21   GLUCOSE 114*   BUN 18   CREATININE 0.70   CALCIUM 8.8     Recent Labs     11/12/23  0609   ALTSGPT 17   ASTSGOT 16   ALKPHOSPHAT 68   TBILIRUBIN 0.9   LIPASE 24   GLUCOSE 114*         No results for input(s): \"NTPROBNP\" in the last 72 hours.      Recent Labs     11/12/23  0609   TROPONINT 9       Imaging:  CT-CTA CHEST PULMONARY ARTERY W/ RECONS   Final Result         1. No pulmonary.   2. Bibasilar airspace disease.   3. Cardiomegaly.   4. Sclerosis within the T9 vertebral body, which could represent metastatic disease, lymphoma, or other.   5. Emphysema.            OUTSIDE IMAGES-CT CHEST   Final Result      OUTSIDE IMAGES-DX CHEST   Final Result      NM-CARDIAC STRESS TEST    (Results Pending)       X-Ray:  I have personally reviewed the images and compared with prior images.  EKG:  I have personally reviewed the images and compared with prior images.    Assessment/Plan:  Justification for Admission Status  I anticipate this patient will require at least two midnights " for appropriate medical management, necessitating inpatient admission because acute hypoxic respiratory failure    Patient will need a Telemetry bed on MEDICAL service .  The need is seco    * Acute respiratory failure with hypoxia (HCC)- (present on admission)  Assessment & Plan  Patient does not carry any previous respiratory diagnoses though he does have a distant history of tobacco use, and was exposed to significant dust in his previous job as a .  CT is negative for PE, but does demonstrate changes consistent with emphysema.  85% on room air on presentation  Some wheezing on exam  Check spirometry  Start systemic steroids and scheduled bronchodilators  O2 and RT protocols  Cardiac echo    Primary hypertension  Assessment & Plan  Continue lisinopril monitor and titrate    Dyslipidemia  Assessment & Plan  Check lipid panel    Lesion of bone of thoracic spine  Assessment & Plan  Patient has a T9 sclerotic lesion concerning for neoplastic process  We will discuss with oncology orthopedics  He does not have any previous history of neoplastic process    Pain in the chest  Assessment & Plan  Etiology uncertain at this point  Pain was improved with nitroglycerin though this is nonspecific  CTA of the chest negative for PE though did demonstrate a T9 sclerotic lesion.  It did not demonstrate any morphologic and findings or other findings which would indicate acute aortic disease though it was not a dedicated aortogram  Musculoskeletal related to sclerotic lesion versus cardiac ischemia versus aortic disease are most likely etiologies though certainly differential extends beyond this  EKG which I have personally reviewed demonstrates sinus rhythm with left bundle branch block.  No evidence of acute ischemia.  No change to comparison study from  [place]  Admit to telemetry  Serial troponins  Cardiac echo  Stress imaging study in the a.m. if he rules out overnight  Discuss sclerotic lesion with  oncology/orthopedics  As needed support        VTE prophylaxis: Xarelto 10 mg daily as prophylaxis

## 2023-11-12 NOTE — ED NOTES
Bedside report given to RN Maci    Oxygen:3L NC  Fall Risk status: sign on door, bed in lowest position/locked  Patient Mentation:A+O x 4  Continuous cardiac monitoring: sinus bradycardia

## 2023-11-12 NOTE — RESPIRATORY CARE
PULMONARY FUNCTION TEST by COPD CLINICAL EDUCATOR  11/12/2023 at 2:46 PM by Linsey Machado RRT     PFT performed by COPD educator. Bedside PFT will reside in results tab on EMR and sent to inpatient pulmonary to interpret.      PFT Results    Lab Results   Component Value Date    FEV1 (L) 1.54 11/12/2023    FEV1/FVC % 72.61 11/12/2023    FVC % Predicted 62 11/12/2023    FEV1 % Predicted 59 11/12/2023    FVC 2.12 11/12/2023    FEV1/FVC % Predicted 94 11/12/2023       Meds to Beds

## 2023-11-13 ENCOUNTER — APPOINTMENT (OUTPATIENT)
Dept: CARDIOLOGY | Facility: MEDICAL CENTER | Age: 73
DRG: 189 | End: 2023-11-13
Attending: HOSPITALIST
Payer: MEDICARE

## 2023-11-13 ENCOUNTER — APPOINTMENT (OUTPATIENT)
Dept: RADIOLOGY | Facility: MEDICAL CENTER | Age: 73
DRG: 189 | End: 2023-11-13
Attending: HOSPITALIST
Payer: MEDICARE

## 2023-11-13 LAB
CHOLEST SERPL-MCNC: 159 MG/DL (ref 100–199)
HDLC SERPL-MCNC: 69 MG/DL
LDLC SERPL CALC-MCNC: 78 MG/DL
LV EJECT FRACT  99904: 60
LV EJECT FRACT MOD 4C 99902: 56.31
TRIGL SERPL-MCNC: 59 MG/DL (ref 0–149)
TROPONIN T SERPL-MCNC: 7 NG/L (ref 6–19)

## 2023-11-13 PROCEDURE — 700101 HCHG RX REV CODE 250: Performed by: HOSPITALIST

## 2023-11-13 PROCEDURE — 700102 HCHG RX REV CODE 250 W/ 637 OVERRIDE(OP): Performed by: HOSPITALIST

## 2023-11-13 PROCEDURE — 93306 TTE W/DOPPLER COMPLETE: CPT | Mod: 26 | Performed by: INTERNAL MEDICINE

## 2023-11-13 PROCEDURE — 700117 HCHG RX CONTRAST REV CODE 255: Performed by: HOSPITALIST

## 2023-11-13 PROCEDURE — 84484 ASSAY OF TROPONIN QUANT: CPT

## 2023-11-13 PROCEDURE — A9270 NON-COVERED ITEM OR SERVICE: HCPCS | Performed by: HOSPITALIST

## 2023-11-13 PROCEDURE — 700111 HCHG RX REV CODE 636 W/ 250 OVERRIDE (IP): Performed by: HOSPITALIST

## 2023-11-13 PROCEDURE — 78452 HT MUSCLE IMAGE SPECT MULT: CPT

## 2023-11-13 PROCEDURE — 99233 SBSQ HOSP IP/OBS HIGH 50: CPT | Performed by: INTERNAL MEDICINE

## 2023-11-13 PROCEDURE — 770020 HCHG ROOM/CARE - TELE (206)

## 2023-11-13 PROCEDURE — 700111 HCHG RX REV CODE 636 W/ 250 OVERRIDE (IP)

## 2023-11-13 PROCEDURE — 94640 AIRWAY INHALATION TREATMENT: CPT

## 2023-11-13 PROCEDURE — 93306 TTE W/DOPPLER COMPLETE: CPT

## 2023-11-13 PROCEDURE — 700101 HCHG RX REV CODE 250: Performed by: INTERNAL MEDICINE

## 2023-11-13 PROCEDURE — 700102 HCHG RX REV CODE 250 W/ 637 OVERRIDE(OP): Performed by: INTERNAL MEDICINE

## 2023-11-13 PROCEDURE — A9270 NON-COVERED ITEM OR SERVICE: HCPCS | Performed by: INTERNAL MEDICINE

## 2023-11-13 PROCEDURE — 80061 LIPID PANEL: CPT

## 2023-11-13 RX ORDER — ASPIRIN 81 MG/1
81 TABLET ORAL DAILY
Status: DISCONTINUED | OUTPATIENT
Start: 2023-11-14 | End: 2023-11-14 | Stop reason: HOSPADM

## 2023-11-13 RX ORDER — REGADENOSON 0.08 MG/ML
0.4 INJECTION, SOLUTION INTRAVENOUS ONCE
Status: COMPLETED | OUTPATIENT
Start: 2023-11-13 | End: 2023-11-13

## 2023-11-13 RX ORDER — IPRATROPIUM BROMIDE AND ALBUTEROL SULFATE 2.5; .5 MG/3ML; MG/3ML
3 SOLUTION RESPIRATORY (INHALATION) 4 TIMES DAILY
Status: DISCONTINUED | OUTPATIENT
Start: 2023-11-13 | End: 2023-11-14 | Stop reason: HOSPADM

## 2023-11-13 RX ORDER — ATORVASTATIN CALCIUM 40 MG/1
40 TABLET, FILM COATED ORAL EVERY EVENING
Status: DISCONTINUED | OUTPATIENT
Start: 2023-11-13 | End: 2023-11-14 | Stop reason: HOSPADM

## 2023-11-13 RX ORDER — AMINOPHYLLINE 25 MG/ML
100 INJECTION, SOLUTION INTRAVENOUS
Status: DISCONTINUED | OUTPATIENT
Start: 2023-11-13 | End: 2023-11-14 | Stop reason: HOSPADM

## 2023-11-13 RX ORDER — ASPIRIN 81 MG/1
81 TABLET, CHEWABLE ORAL DAILY
Status: DISCONTINUED | OUTPATIENT
Start: 2023-11-14 | End: 2023-11-14 | Stop reason: HOSPADM

## 2023-11-13 RX ORDER — REGADENOSON 0.08 MG/ML
INJECTION, SOLUTION INTRAVENOUS
Status: COMPLETED
Start: 2023-11-13 | End: 2023-11-13

## 2023-11-13 RX ADMIN — REGADENOSON 0.4 MG: 0.08 INJECTION, SOLUTION INTRAVENOUS at 11:20

## 2023-11-13 RX ADMIN — IPRATROPIUM BROMIDE AND ALBUTEROL SULFATE 3 ML: 2.5; .5 SOLUTION RESPIRATORY (INHALATION) at 19:17

## 2023-11-13 RX ADMIN — IPRATROPIUM BROMIDE AND ALBUTEROL SULFATE 3 ML: 2.5; .5 SOLUTION RESPIRATORY (INHALATION) at 06:50

## 2023-11-13 RX ADMIN — ATORVASTATIN CALCIUM 40 MG: 40 TABLET, FILM COATED ORAL at 20:54

## 2023-11-13 RX ADMIN — ENOXAPARIN SODIUM 40 MG: 100 INJECTION SUBCUTANEOUS at 17:50

## 2023-11-13 RX ADMIN — ASPIRIN 324 MG: 81 TABLET, CHEWABLE ORAL at 05:10

## 2023-11-13 RX ADMIN — HUMAN ALBUMIN MICROSPHERES AND PERFLUTREN 3 ML: 10; .22 INJECTION, SOLUTION INTRAVENOUS at 14:15

## 2023-11-13 RX ADMIN — LISINOPRIL 10 MG: 10 TABLET ORAL at 05:10

## 2023-11-13 RX ADMIN — PREDNISONE 20 MG: 20 TABLET ORAL at 05:11

## 2023-11-13 ASSESSMENT — COGNITIVE AND FUNCTIONAL STATUS - GENERAL
SUGGESTED CMS G CODE MODIFIER DAILY ACTIVITY: CH
DAILY ACTIVITIY SCORE: 24
MOBILITY SCORE: 24
SUGGESTED CMS G CODE MODIFIER MOBILITY: CH

## 2023-11-13 ASSESSMENT — LIFESTYLE VARIABLES
EVER HAD A DRINK FIRST THING IN THE MORNING TO STEADY YOUR NERVES TO GET RID OF A HANGOVER: NO
HOW MANY TIMES IN THE PAST YEAR HAVE YOU HAD 5 OR MORE DRINKS IN A DAY: 0
AVERAGE NUMBER OF DAYS PER WEEK YOU HAVE A DRINK CONTAINING ALCOHOL: 0
HAVE YOU EVER FELT YOU SHOULD CUT DOWN ON YOUR DRINKING: NO
ON A TYPICAL DAY WHEN YOU DRINK ALCOHOL HOW MANY DRINKS DO YOU HAVE: 0
ALCOHOL_USE: NO
TOTAL SCORE: 0
EVER FELT BAD OR GUILTY ABOUT YOUR DRINKING: NO
TOTAL SCORE: 0
CONSUMPTION TOTAL: NEGATIVE
TOTAL SCORE: 0
HAVE PEOPLE ANNOYED YOU BY CRITICIZING YOUR DRINKING: NO

## 2023-11-13 ASSESSMENT — FIBROSIS 4 INDEX: FIB4 SCORE: 1.68

## 2023-11-13 ASSESSMENT — ENCOUNTER SYMPTOMS: SHORTNESS OF BREATH: 1

## 2023-11-13 ASSESSMENT — PATIENT HEALTH QUESTIONNAIRE - PHQ9
SUM OF ALL RESPONSES TO PHQ9 QUESTIONS 1 AND 2: 0
1. LITTLE INTEREST OR PLEASURE IN DOING THINGS: NOT AT ALL
2. FEELING DOWN, DEPRESSED, IRRITABLE, OR HOPELESS: NOT AT ALL

## 2023-11-13 ASSESSMENT — PAIN DESCRIPTION - PAIN TYPE: TYPE: ACUTE PAIN

## 2023-11-13 NOTE — PROGRESS NOTES
Assumed care at 7am, bedside report received from Celso MCKNIGHT. Pt. Is SR on the monitor. Initial assessment completed, orders reviewed, call light within reach, and hourly rounding in place. POC addressed with patient and family no additional questions at this time.

## 2023-11-13 NOTE — CARE PLAN
The patient is Watcher - Medium risk of patient condition declining or worsening    Shift Goals  Clinical Goals: comfort, vss  Patient Goals: comfort  Family Goals: not available    Progress made toward(s) clinical / shift goals:      Problem: Respiratory  Goal: Patient will achieve/maintain optimum respiratory ventilation and gas exchange  Outcome: Progressing     Problem: Mobility  Goal: Patient's capacity to carry out activities will improve  Outcome: Progressing       Patient is not progressing towards the following goals:

## 2023-11-13 NOTE — CARE PLAN
The patient is Stable - Low risk of patient condition declining or worsening    Shift Goals  Clinical Goals: Stress test, wean off O2  Patient Goals: Stress test, comfort  Family Goals: Stress test updates on POC and results    Progress made toward(s) clinical / shift goals:     Problem: Respiratory  Goal: Patient will achieve/maintain optimum respiratory ventilation and gas exchange  Outcome: Progressing  Flowsheets  Taken 11/13/2023 1527 by Jeniffer Tinoco RSHERRI  Deep Breathe and Cough: Performs Correctly  Taken 11/13/2023 1503 by Geovanna Lewis, C.N.A.  O2 Delivery Device: Silicone Nasal Cannula     Problem: Knowledge Deficit - Standard  Goal: Patient and family/care givers will demonstrate understanding of plan of care, disease process/condition, diagnostic tests and medications  Outcome: Progressing

## 2023-11-13 NOTE — PROGRESS NOTES
"[facility] Medicine Daily Progress Note    Date of Service  11/13/2023    Chief Complaint  Jovany Castillo is a 73 y.o. male admitted 11/12/2023 with   Chief Complaint   Patient presents with    Chest Pain     Pt states chest pain started 1 day ago on the L side and radiated towards the middle of the chest, pt was seen at [place] and transferred to [facility] for further evaluation     Other     Hypoxia-pt requiring 5 L NC to maintain oxygen saturations, pt typically is on room air         Hospital Course  No notes on file  Jovany Castillo is a 73 y.o. male who presented 11/12/2023 with previous history of dyslipidemia, hypertension, and obesity.  He also has a history of smoking, but quit smoking 40 years ago.  He was employed as a  and is now retired.  He does not drink     Patient lives in [place] and presented to the hospital there with complaint of chest pain.  Chest pain began yesterday, with no history of trauma.  He reports pain that initially was on the left shoulder, but has since spread to involve the left chest and to the center of the chest.  Pain is worse when he takes a deep breath and he states that he has been \"panting\".  He is otherwise unsure of anything that makes pain worse.  It is made better with nitroglycerin, which she was given in [place], and with morphine which she was given in transit.     In [place] patient was evaluated with chest x-ray lab and EKG.  EKG shows left bundle branch, but no acute ischemic changes.  Troponin was negative.  Labs relatively unremarkable.  Chest x-ray interpreted as negative.  Patient was however hypoxic requiring 3 L to maintain saturations in the low 90s.  He was 85% on room air on presentation.     Here patient has had repeat work-up with the addition of CTA chest.  CT has been negative for PE, but did demonstrate some emphysematous changes, and a T9 sclerotic lesion which is worrisome for neoplastic process.  Repeat EKG is stable.    Interval Problem " Update  Patient was seen and examined at bedside.  I have personally reviewed and interpreted vitals, labs, and imaging.    11/13.  Afebrile.  Intermittent bradycardia.  On 4-5 L oxygen.  Denies fever, chills.  Patient states chest pain resolved last night.  Still feels less short of breath but much better with oxygen.  He does have a cough.  Stress test this morning showed small infarct and anteroseptal left ventricle, no reversible ischemia.  Consulted cardiology.  Echo was performed but read still pending.    I have discussed this patient's plan of care and discharge plan at IDT rounds today with Case Management, Nursing, Nursing leadership, and other members of the IDT team.    Consultants/Specialty  cardiology    Code Status  Full Code    Disposition  The patient is not medically cleared for discharge to home or a post-acute facility.  Anticipate discharge to: home with close outpatient follow-up    I have placed the appropriate orders for post-discharge needs.    Review of Systems  Review of Systems   Respiratory:  Positive for shortness of breath.    Cardiovascular:  Positive for chest pain.        Physical Exam  Temp:  [36.6 °C (97.9 °F)-36.7 °C (98.1 °F)] 36.6 °C (97.9 °F)  Pulse:  [50-79] 56  Resp:  [13-34] 16  BP: (120-141)/(60-87) 128/69  SpO2:  [88 %-95 %] 91 %    Physical Exam  Vitals and nursing note reviewed.   Constitutional:       Appearance: Normal appearance. He is ill-appearing.   HENT:      Head: Normocephalic and atraumatic.      Nose: Nose normal.      Mouth/Throat:      Mouth: Mucous membranes are moist.      Pharynx: Oropharynx is clear.   Eyes:      Extraocular Movements: Extraocular movements intact.      Conjunctiva/sclera: Conjunctivae normal.   Cardiovascular:      Rate and Rhythm: Regular rhythm. Bradycardia present.      Pulses: Normal pulses.      Heart sounds: Normal heart sounds. No murmur heard.     No friction rub. No gallop.   Pulmonary:      Effort: Pulmonary effort is normal.  No respiratory distress.      Breath sounds: Wheezing and rales present.   Chest:      Chest wall: No tenderness.   Abdominal:      General: Abdomen is flat. Bowel sounds are normal. There is no distension.      Palpations: Abdomen is soft. There is no mass.      Tenderness: There is no abdominal tenderness. There is no guarding.   Musculoskeletal:         General: Normal range of motion.      Cervical back: Normal range of motion and neck supple.   Skin:     General: Skin is warm.      Capillary Refill: Capillary refill takes less than 2 seconds.   Neurological:      General: No focal deficit present.      Mental Status: He is alert and oriented to person, place, and time. Mental status is at baseline.      Cranial Nerves: No cranial nerve deficit.      Motor: No weakness.   Psychiatric:         Mood and Affect: Mood normal.         Behavior: Behavior normal.         Fluids  No intake or output data in the 24 hours ending 11/13/23 0748    Laboratory  Recent Labs     11/12/23  0609   WBC 8.3   RBC 4.94   HEMOGLOBIN 16.8   HEMATOCRIT 48.5   MCV 98.2*   MCH 34.0*   MCHC 34.6   RDW 47.0   PLATELETCT 171   MPV 10.6     Recent Labs     11/12/23  0609   SODIUM 139   POTASSIUM 4.7   CHLORIDE 107   CO2 21   GLUCOSE 114*   BUN 18   CREATININE 0.70   CALCIUM 8.8             Recent Labs     11/13/23  0434   TRIGLYCERIDE 59   HDL 69   LDL 78       Imaging  EC-ECHOCARDIOGRAM COMPLETE W/ CONT   Final Result      NM-CARDIAC STRESS TEST   Final Result      CT-CTA CHEST PULMONARY ARTERY W/ RECONS   Final Result         1. No pulmonary.   2. Bibasilar airspace disease.   3. Cardiomegaly.   4. Sclerosis within the T9 vertebral body, which could represent metastatic disease, lymphoma, or other.   5. Emphysema.            OUTSIDE IMAGES-CT CHEST   Final Result      OUTSIDE IMAGES-DX CHEST   Final Result           Assessment/Plan  * Acute respiratory failure with hypoxia (HCC)- (present on admission)  Assessment &  Plan  11/13/2023  Patient does not carry any previous respiratory diagnoses though he does have a distant history of tobacco use, and was exposed to significant dust in his previous job as a .  CT is negative for PE, but does demonstrate changes consistent with emphysema.  85% on room air on presentation  Some wheezing on exam  Check spirometry.  Showing restrictive pattern  Start systemic steroids and scheduled bronchodilators  O2 and RT protocols  Cardiac echo    Primary hypertension  Assessment & Plan  11/13/2023  Continue lisinopril monitor and titrate    Dyslipidemia  Assessment & Plan  11/13/2023  Check lipid panel  LDL 78  HDL 69  Switch to high intensity atorvastatin    Lesion of bone of thoracic spine  Assessment & Plan  11/13/2023  Patient has a T9 sclerotic lesion concerning for neoplastic process  He does not have any previous history of neoplastic process  He has chronic back pain likely from a chronic L1 compression fracture.  No prior mid back pain  No need for emergent inpatient vertebral biopsy  Order IR vertebral biopsy as outpatient.    Pain in the chest  Assessment & Plan  11/13/2023  Etiology uncertain at this point  Pain was improved with nitroglycerin though this is nonspecific  CTA of the chest negative for PE though did demonstrate a T9 sclerotic lesion.  It did not demonstrate any morphologic and findings or other findings which would indicate acute aortic disease though it was not a dedicated aortogram  Musculoskeletal related to sclerotic lesion versus cardiac ischemia versus aortic disease are most likely etiologies though certainly differential extends beyond this  EKG which I have personally reviewed demonstrates sinus rhythm with left bundle branch block.  No evidence of acute ischemia.  No change to comparison study from  [place]  Admit to telemetry  Serial troponins.  Flat  Cardiac echo  Stress imaging study in the a.m. shows nonreversible infarct.  Consulted  cardiology  As needed support         VTE prophylaxis: Lovenox    I have performed a physical exam and reviewed and updated ROS and Plan today (11/13/2023). In review of yesterday's note (11/12/2023), there are no changes except as documented above.      Greater than 51 minutes spent prepping to see patient (e.g. review of tests) obtaining and/or reviewing separately obtained history. Performing a medically appropriate examination and/ evaluation.  Counseling and educating the patient/family/caregiver.  Ordering medications, tests, or procedures.  Referring and communicating with other health care professionals.  Documenting clinical information in EPIC.  Independently interpreting results and communicating results to patient/family/caregiver.  Care coordination.

## 2023-11-13 NOTE — ED NOTES
Patient transferred over to yellow pod in hospital bed. Patient placed on monitor, call light within reach. Report to float RN

## 2023-11-13 NOTE — PROGRESS NOTES
Assumed care of patient from day shift RN at 1915, Patient AOX4, VSS.   Fall education reinforced, call bell within reach, bed locked and in lowest position. POC discussed and all questions answered.  Purposeful and or hourly rounding in place.

## 2023-11-13 NOTE — ED NOTES
[facility], appreciative and thankful, pt state had lunch that family provided

## 2023-11-13 NOTE — DISCHARGE PLANNING
Case Management Discharge Planning    Admission Date: 11/12/2023  GMLOS: 1.7  ALOS: 1    6-Clicks ADL Score: 24  6-Clicks Mobility Score: 24      Anticipated Discharge Dispo: Discharge Disposition: Discharged to home/self care (01)  Discharge Address: [address] [facility] [address]  Discharge Contact Phone Number: [phone]    DME Needed: No    Action(s) Taken: Updated Provider/Nurse on Discharge Plan, DC Assessment Complete (See below), and Choice obtained    RN RISA met with Pt and family. Choice obtained for Oxygen and fax to [facility] pending order.    Escalations Completed: None    Medically Clear: No    Next Steps: CM will continue to assist Pt with discharge needs.      Barriers to Discharge: Medical clearance    Is the patient up for discharge tomorrow: No    Care Transition Team Assessment    RN RISA met with Pt at bedside to obtain assessment informations. Demographics verified. RN RISA introduced self and purpose of visit.   Pt lives with spouse in a mobile home with 4 steps to main entrance.  Pt has  spouse and son for support.  Pt goes to [facility] in [facility] for PCP  Pt was independent with ADLs prior to hospitalization.      Information Source  Orientation Level: Oriented X4  Information Given By: Patient  Who is responsible for making decisions for patient? : Patient      Elopement Risk  Legal Hold: No  Ambulatory or Self Mobile in Wheelchair: No-Not an Elopement Risk    Interdisciplinary Discharge Planning  Primary Care Physician: Pt goes to [facility] at [facility]  Lives with - Patient's Self Care Capacity: Spouse  Support Systems: Spouse / Significant Other, Family Member(s)  Housing / Facility: Mobile Home (4 steps to main entrance)    Discharge Preparedness  What is your plan after discharge?: Home with help  What are your discharge supports?: Spouse, Child  Prior Functional Level: Ambulatory, Independent with Activities of Daily Living, Independent with Medication Management    Functional  Assesment  Prior Functional Level: Ambulatory, Independent with Activities of Daily Living, Independent with Medication Management    Finances  Financial Barriers to Discharge: No  Prescription Coverage: Yes    Vision / Hearing Impairment  Right Eye Vision: Wears Glasses  Left Eye Vision: Wears Glasses  Hearing Impairment: Both Ears              Domestic Abuse  Have you ever been the victim of abuse or violence?: No  Physical Abuse or Sexual Abuse: No  Verbal Abuse or Emotional Abuse: No  Possible Abuse/Neglect Reported to:: Not Applicable    Psychological Assessment  History of Psychiatric Problems: No         Anticipated Discharge Information  Discharge Disposition: Discharged to home/self care (01)  Discharge Address: [address] [place] [id number]  Discharge Contact Phone Number: [phone]

## 2023-11-14 ENCOUNTER — PATIENT OUTREACH (OUTPATIENT)
Dept: SCHEDULING | Facility: IMAGING CENTER | Age: 73
End: 2023-11-14
Payer: MEDICARE

## 2023-11-14 ENCOUNTER — PHARMACY VISIT (OUTPATIENT)
Dept: PHARMACY | Facility: MEDICAL CENTER | Age: 73
End: 2023-11-14
Payer: COMMERCIAL

## 2023-11-14 VITALS
BODY MASS INDEX: 36.4 KG/M2 | DIASTOLIC BLOOD PRESSURE: 57 MMHG | RESPIRATION RATE: 17 BRPM | SYSTOLIC BLOOD PRESSURE: 124 MMHG | TEMPERATURE: 98 F | HEIGHT: 67 IN | HEART RATE: 62 BPM | WEIGHT: 231.92 LBS | OXYGEN SATURATION: 90 %

## 2023-11-14 PROBLEM — J43.9 EMPHYSEMA LUNG (HCC): Status: ACTIVE | Noted: 2023-11-14

## 2023-11-14 LAB
ANION GAP SERPL CALC-SCNC: 8 MMOL/L (ref 7–16)
BUN SERPL-MCNC: 26 MG/DL (ref 8–22)
CALCIUM SERPL-MCNC: 8.5 MG/DL (ref 8.5–10.5)
CHLORIDE SERPL-SCNC: 107 MMOL/L (ref 96–112)
CO2 SERPL-SCNC: 26 MMOL/L (ref 20–33)
CREAT SERPL-MCNC: 0.8 MG/DL (ref 0.5–1.4)
ERYTHROCYTE [DISTWIDTH] IN BLOOD BY AUTOMATED COUNT: 49.3 FL (ref 35.9–50)
GFR SERPLBLD CREATININE-BSD FMLA CKD-EPI: 93 ML/MIN/1.73 M 2
GLUCOSE SERPL-MCNC: 98 MG/DL (ref 65–99)
HCT VFR BLD AUTO: 43.9 % (ref 42–52)
HGB BLD-MCNC: 14.8 G/DL (ref 14–18)
MAGNESIUM SERPL-MCNC: 2.1 MG/DL (ref 1.5–2.5)
MCH RBC QN AUTO: 33.9 PG (ref 27–33)
MCHC RBC AUTO-ENTMCNC: 33.7 G/DL (ref 32.3–36.5)
MCV RBC AUTO: 100.5 FL (ref 81.4–97.8)
PHOSPHATE SERPL-MCNC: 3.5 MG/DL (ref 2.5–4.5)
PLATELET # BLD AUTO: 211 K/UL (ref 164–446)
PMV BLD AUTO: 10.2 FL (ref 9–12.9)
POTASSIUM SERPL-SCNC: 4.3 MMOL/L (ref 3.6–5.5)
RBC # BLD AUTO: 4.37 M/UL (ref 4.7–6.1)
SODIUM SERPL-SCNC: 141 MMOL/L (ref 135–145)
WBC # BLD AUTO: 9.4 K/UL (ref 4.8–10.8)

## 2023-11-14 PROCEDURE — 36415 COLL VENOUS BLD VENIPUNCTURE: CPT

## 2023-11-14 PROCEDURE — 700111 HCHG RX REV CODE 636 W/ 250 OVERRIDE (IP): Performed by: HOSPITALIST

## 2023-11-14 PROCEDURE — 700101 HCHG RX REV CODE 250: Performed by: INTERNAL MEDICINE

## 2023-11-14 PROCEDURE — 80048 BASIC METABOLIC PNL TOTAL CA: CPT

## 2023-11-14 PROCEDURE — A9270 NON-COVERED ITEM OR SERVICE: HCPCS | Performed by: INTERNAL MEDICINE

## 2023-11-14 PROCEDURE — 83735 ASSAY OF MAGNESIUM: CPT

## 2023-11-14 PROCEDURE — A9270 NON-COVERED ITEM OR SERVICE: HCPCS | Performed by: HOSPITALIST

## 2023-11-14 PROCEDURE — 84100 ASSAY OF PHOSPHORUS: CPT

## 2023-11-14 PROCEDURE — 99239 HOSP IP/OBS DSCHRG MGMT >30: CPT | Performed by: HOSPITALIST

## 2023-11-14 PROCEDURE — 700102 HCHG RX REV CODE 250 W/ 637 OVERRIDE(OP): Performed by: HOSPITALIST

## 2023-11-14 PROCEDURE — 700102 HCHG RX REV CODE 250 W/ 637 OVERRIDE(OP): Performed by: INTERNAL MEDICINE

## 2023-11-14 PROCEDURE — 94640 AIRWAY INHALATION TREATMENT: CPT

## 2023-11-14 PROCEDURE — 85027 COMPLETE CBC AUTOMATED: CPT

## 2023-11-14 PROCEDURE — RXMED WILLOW AMBULATORY MEDICATION CHARGE: Performed by: HOSPITALIST

## 2023-11-14 PROCEDURE — 82103 ALPHA-1-ANTITRYPSIN TOTAL: CPT

## 2023-11-14 RX ORDER — LISINOPRIL 10 MG/1
10 TABLET ORAL DAILY
Qty: 30 TABLET | Refills: 2 | Status: SHIPPED | OUTPATIENT
Start: 2023-11-15

## 2023-11-14 RX ORDER — LISINOPRIL 40 MG/1
40 TABLET ORAL EVERY EVENING
Qty: 30 TABLET | Refills: 2 | Status: SHIPPED | OUTPATIENT
Start: 2023-11-14 | End: 2023-11-14

## 2023-11-14 RX ORDER — PREDNISONE 20 MG/1
TABLET ORAL
Qty: 8 TABLET | Refills: 0 | Status: SHIPPED | OUTPATIENT
Start: 2023-11-14 | End: 2024-02-19

## 2023-11-14 RX ADMIN — ATORVASTATIN CALCIUM 40 MG: 40 TABLET, FILM COATED ORAL at 17:00

## 2023-11-14 RX ADMIN — PREDNISONE 20 MG: 20 TABLET ORAL at 05:09

## 2023-11-14 RX ADMIN — ENOXAPARIN SODIUM 40 MG: 100 INJECTION SUBCUTANEOUS at 17:01

## 2023-11-14 RX ADMIN — LISINOPRIL 10 MG: 10 TABLET ORAL at 05:09

## 2023-11-14 RX ADMIN — IPRATROPIUM BROMIDE AND ALBUTEROL SULFATE 3 ML: 2.5; .5 SOLUTION RESPIRATORY (INHALATION) at 10:44

## 2023-11-14 RX ADMIN — ASPIRIN 81 MG: 81 TABLET, COATED ORAL at 05:09

## 2023-11-14 ASSESSMENT — FIBROSIS 4 INDEX: FIB4 SCORE: 1.68

## 2023-11-14 ASSESSMENT — PAIN DESCRIPTION - PAIN TYPE: TYPE: ACUTE PAIN

## 2023-11-14 NOTE — CARE PLAN
The patient is Stable - Low risk of patient condition declining or worsening    Shift Goals  Clinical Goals: wean O2  Patient Goals: Stress test, comfort  Family Goals: Stress test updates on POC and results    Progress made toward(s) clinical / shift goals:  YES    Problem: Psychosocial  Goal: Patient's level of anxiety will decrease  Description: Target End Date:  1-3 days or as soon as patient condition allows    1.  Collaborate with patient and family/caregiver to identify triggers and develop strategies to cope with anxiety  2.  Implement stimuli reduction, calming techniques  3.  Pharmacologic management per provider order  4.  Encourage patient/family/care giver participation  5.  Collaborate with interdisciplinary team including Psychologist or Behavioral Health Team as needed  Outcome: Progressing  Goal: Patient's ability to verbalize feelings about condition will improve  Description: Target End Date:  Prior to discharge or change in level of care    1.  Discuss coping with medical condition and its effects  2.  Encourage patient participation in care  3.  Encourage acknowledgement of body changes and accompanying emotions  4.  Perform depression screening  Outcome: Progressing  Goal: Patient's ability to re-evaluate and adapt role responsibilities will improve  Description: Target End Date:  Prior to discharge or change in level of care    1.  Assess family support  2.  Encourage support system participation in care  3.  Encouraged verbalization of feelings regarding caregiver responsibilities  4.  Discuss changes in role and responsibilities caused by patient's condition  Outcome: Progressing  Goal: Patient and family will demonstrate ability to cope with life altering diagnosis and/or procedure  Description: Target End Date:  1-3 days or as soon as patient condition allows    1. Expect initial shock and disbelief following diagnosis of cancer and traumatizing procedures (disfiguring surgery, colostomy,  amputation).  2.  Assess patient and family/caregiver for stage of grief currently being experienced. Explain process as appropriate.  3.  Provide open, nonjudgmental environment. Use therapeutic communication skills of Active-Listening, acknowledgment, and so on.  4.  Encourage verbalization of thoughts or concerns and accept expressions of sadness, anger, rejection. Acknowledge normality of these feelings  5.  Be aware of mood swings, hostility, and other acting-out behavior. Set limits on inappropriate behavior, redirect negative thinking  6.  Be aware of debilitating depression. Ask patient direct questions about state of mind.  7.  Visit frequently and provide physical contact as appropriate, or provide frequent phone support as appropriate for setting. Arrange for care provider and support person to stay with patient as needed  8.  Reinforce teaching regarding disease process and treatments and provide information as appropriate about dying. Be honest; do not give false hope while providing emotional support  9.  Review past life experiences, role changes, and coping skills. Talk about things that interest the patient  Outcome: Progressing  Goal: Spiritual and cultural needs incorporated into hospitalization  Description: Target End Date:  End of day 1    1.  Encourage verbalization of feelings, concerns, expectations and needs  2.  Collaborate with Case Management/  3.  Collaborate with Pastoral Care to meet spiritual needs  Outcome: Progressing     Problem: Communication  Goal: The ability to communicate needs accurately and effectively will improve  Description: Target End Date:  End of day 1    1.  Assess ability to communicate and understand  2.  Provide augmentative or alternative methods of communication devices  3.  Use /language line as appropriate  4.  Collaborate with Speech Therapy as needed  Outcome: Progressing     Problem: Respiratory  Goal: Patient will  achieve/maintain optimum respiratory ventilation and gas exchange  Description: Target End Date:  Prior to discharge or change in level of care    Document on Assessment flowsheet    1.  Assess and monitor rate, rhythm, depth and effort of respiration  2.  Breath sounds assessed qshift and/or as needed  3.  Assess O2 saturation, administer/titrate oxygen as ordered  4.  Position patient for maximum ventilatory efficiency  5.  Turn, cough, and deep breath with splinting to improve effectiveness  6.  Collaborate with RT to administer medication/treatments per order  7.  Encourage use of incentive spirometer and encourage patient to cough after use and utilize splinting techniques if applicable  8.  Airway suctioning  9.  Monitor sputum production for changes in color, consistency and frequency  10. Perform frequent oral hygiene  11. Alternate physical activity with rest periods  11/14/2023 0245 by Mimi Lange R.N.  Outcome: Progressing  Flowsheets  Taken 11/14/2023 0243 by Mimi Lange R.N.  Incentive Spirometer: Effective  Taken 11/13/2023 2319 by Sintia Armas  O2 Delivery Device: Silicone Nasal Cannula  Taken 11/13/2023 1950 by Mimi Lange R.N.  Incentive Spirometer Volume: 1500 mL  Deep Breathe and Cough: Performs Correctly  11/14/2023 0243 by Mimi Lange R.N.  Flowsheets  Taken 11/14/2023 0243 by Mimi Lange R.N.  Incentive Spirometer: Effective  Taken 11/13/2023 2319 by Sintia Armas  O2 Delivery Device: Silicone Nasal Cannula  Taken 11/13/2023 1950 by Mimi Lange R.N.  Deep Breathe and Cough: Performs Correctly     Problem: Mechanical Ventilation  Goal: Safe management of artificial airway and ventilation  Description: Target End Date:  when vent discontinued    Document on VAP flowsheet and Airway LDA    1.  Daily awakening trials per provider order/policy  2.  Suctioning and care of ET/Trach tube (document on LDA)  3.  Collaborate with RT to administer  medications/treatments  4.  Ambu bag at bedside and available for transport  5.  Trach patient - replacement trach at bedside  6.  Provide communication tools if applicable  Outcome: Progressing  Goal: Successful weaning off mechanical ventilator, spontaneously maintains adequate gas exchange  Description: Target End Date:  when vent discontinued    1.  Follow universal weaning protocol for patients on mechanical ventilation per order  2.  Review contraindication list.  Obtain provider order to wean in presence of contraindication.  3.  Obtain Stew Sedation-Agitation Score  4.  Stew Score 1-2:  sedation vacation  5.  Stew Score 3-4:  Collaborate with provider and/or RT to determine readiness for trial  6.  Begin 2 hour trial of weaning following protocol  7.  Evaluate for fatigue parameters per protocol  8.  Fatigue parameters triggered:  Stop wean and return to previous ASV% setting or increase % minute volume to offset work or breathing  Outcome: Progressing  Goal: Patient will be able to express needs and understand communication  Description: Target End Date:  when vent discontinued    1.  Assess ability to communicate and understand  2.  Provide communication tools  3.  Collaborate with Speech Therapy for P[facility]  Outcome: Progressing     Problem: Bowel Elimination  Goal: Establish and maintain regular bowel function  Description: Target End Date:  Prior to discharge or change in level of care    1.   Note date of last BM  2.   Educate about diet, fluid intake, medication and activity to promote bowel function  3.   Educate signs and symptoms of constipation and interventions to implement  4.   Pharmacologic bowel management per provider order  5.   Regular toileting schedule  6.   Upright position for toileting  7.   High fiber diet  8.   Encourage hydration  9.   Collaborate with Clinical Dietician  10. Care and maintenance of ostomy if applicable  Outcome: Progressing     Problem: Mobility  Goal: Patient's  capacity to carry out activities will improve  Description: Target End Date:  Prior to discharge or change in level of care    1.  Assess for barriers to mobility/activity  2.  Implement activity per interdisciplinary team recommendations  3.  Target activity level identified and patient/family/caregiver aware of goal  4.  Provide assistive devices  5.  Instruct patient/caregiver on proper use of assistive/adaptive devices  6.  Schedule activities and rest periods to decrease effects of fatigue  7.  Encourage mobilization to extent of ability  8.  Maintain proper body alignment  9.  Provide adequate pain management to allow progressive mobilization  10. Implement pace maker precautions as needed  Outcome: Progressing     Problem: Infection - Standard  Goal: Patient will remain free from infection  Description: Target End Date:  Prior to discharge or change in level of care    1.  Utilize Standard Precautions at all times to reduce the risk of transmission of microorganisms from both recognized and  unrecognized sources of infection  2.  Infection prevention handouts provided (general/device/diagnosis specific) and documented in Patient Education  3.  Educate patient and family/caregiver on isolation precautions if applicable  Outcome: Progressing     Problem: Knowledge Deficit - Standard  Goal: Patient and family/care givers will demonstrate understanding of plan of care, disease process/condition, diagnostic tests and medications  Description: Target End Date:  1-3 days or as soon as patient condition allows    Document in Patient Education    1.  Patient and family/caregiver oriented to unit, equipment, visitation policy and means for communicating concern  2.  Complete/review Learning Assessment  3.  Assess knowledge level of disease process/condition, treatment plan, diagnostic tests and medications  4.  Explain disease process/condition, treatment plan, diagnostic tests and medications  Outcome: Progressing      Problem: Hemodynamics  Goal: Patient's hemodynamics, fluid balance and neurologic status will be stable or improve  Description: Target End Date:  Prior to discharge or change in level of care    Document on Assessment and I/O flowsheet templates    1.  Monitor vital signs, pulse oximetry and cardiac monitor per provider order and/or policy  2.  Maintain blood pressure per provider order  3.  Hemodynamic monitoring per provider order  4.  Manage IV fluids and IV infusions  5.  Monitor intake and output  6.  Daily weights per unit policy or provider order  7.  Assess peripheral pulses and capillary refill  8.  Assess color and body temperature  9.  Position patient for maximum circulation/cardiac output  10. Monitor for signs/symptoms of excessive bleeding  11. Assess mental status, restlessness and changes in level of consciousness  12. Monitor temperature and report fever or hypothermia to provider immediately. Consideration of targeted temperature management.  Outcome: Progressing       Patient is not progressing towards the following goals:

## 2023-11-14 NOTE — FACE TO FACE
"Face to Face Note  -  Durable Medical Equipment    Ze Mercado M.D. - NPI: [id number]  I certify that this patient is under my care and that they had a durable medical equipment(DME)face to face encounter by myself that meets the physician DME face-to-face encounter requirements with this patient on:    Date of encounter:   Patient:                    MRN:                       YOB: 2024  Jovany Castillo  [id number]  1950     The encounter with the patient was in whole, or in part, for the following medical condition, which is the primary reason for durable medical equipment:  COPD    I certify that, based on my findings, the following durable medical equipment is medically necessary:    Oxygen   HOME O2 Saturation Measurements:(Values must be present for Home Oxygen orders)  Room air sat at rest: 88  Room air sat with amb: 80  With liters of O2: 2, O2 sat at rest with O2: 92  With Liters of O2: 3, O2 sat with amb with O2 : 91  Is the patient mobile?: Yes  If patient feels more short of breath, they can go up to 6 liters per minute and contact healthcare provider.    Supporting Symptoms: The patient requires supplemental oxygen, as the following interventions have been tried with limited or no improvement: \"Ambulation with oximetry.    My Clinical findings support the need for the above equipment due to:  Hypoxia  "

## 2023-11-14 NOTE — PROGRESS NOTES
Monitor Summary:    Rhythm: SR/SB    HR: 53-63    Measurements: .14/.13/.40    Ectopy: rPVC              Normal Values  Rhythm SR  HR Range    Measurements 0.12-0.20 / 0.06-0.10  / 0.30-0.52

## 2023-11-14 NOTE — DISCHARGE PLANNING
Case Management Discharge Planning    Admission Date: 11/12/2023  GMLOS: 1.7  ALOS: 2    6-Clicks ADL Score: 24  6-Clicks Mobility Score: 24      Anticipated Discharge Dispo: Discharge Disposition: Discharged to home/self care (01)  Discharge Address: [address] [facility] [address]  Discharge Contact Phone Number: [phone]    DME Needed: Yes    DME Ordered: Yes  Home Oxygen    Action(s) Taken: Updated Provider/Nurse on Discharge Plan, Choice obtained, and Referral(s) sent    1300, RN RISA provided and explained IMM to Pt.    1320, RN RISA called [facility] [phone]  to inform referral. RN RISA fax Face sheet, DME O2 order and Face to Face DME for oxygen to [facility] .    1520, received call from Willie from [facility] requesting H&P. RN RISA fax H&P to [facility] . Willie said once H&P is received they will send the oxygen to patient's room today.       1600, RN RISA called to follow up with Willie from [facility] and she said they are having issue with their fax system and have not received the H&P yet. Willie said if the H&P will be left at nurse station for  and they will send portable oxygen for Pt to use for transport back home.    1609, RN RISA called [facility]. [phone] to follow up for any CM needs for this Pt.    1622, JUAN LUIS LORD called Willie from [facility] to follow up and call was routed to Fior from [facility]. RN RISA fax the H&P, Face sheet, DME O2 order and Face to Face DME to [facility] fax number . Fior said she cannot process sending oxygen if she does not receive the clinicals. Fax was sent with confirmation. Fior from [facility] confirmed she was able to receive clinical and [facility].    RN RISA informed Willie and Fior from [facility] to call  [facility]. [phone] for further assistance.     IDT team oxygen will be delivered tonight.    Escalations Completed: None    Medically Clear: Yes    Next Steps: CM will continue to  assist Pt with discharge needs.      Barriers to Discharge: DME    Is the patient up for discharge tomorrow: No

## 2023-11-14 NOTE — CARE PLAN
Problem: Bronchoconstriction  Goal: Improve in air movement and diminished wheezing  Description: Target End Date:  2 to 3 days    1.  Implement inhaled treatments  2.  Evaluate and manage medication effects  Outcome: Progressing    Respiratory Update    Treatment modality: duo  Frequency: Q4 changed to QID    Pt tolerating current treatments well with no adverse reactions.

## 2023-11-14 NOTE — PROGRESS NOTES
Assumed care at 7am , bedside report received from Mimi MCKNIGHT. Pt. Is SR/SB on the monitor. Initial assessment completed, orders reviewed, call light within reach, and hourly rounding in place. POC addressed with patient, no additional questions at this time.

## 2023-11-15 LAB — A1AT SERPL-MCNC: 161 MG/DL (ref 90–200)

## 2023-11-15 NOTE — DISCHARGE SUMMARY
Hospital Medicine Discharge Note     Admit Date:  11/12/2023       Discharge Date:   11/14/2023    Attending Physician:  Ze Mercado M.D.     Diagnoses (includes active and resolved):   Principal Problem:    Acute respiratory failure with hypoxia (HCC) (POA: Yes)  Active Problems:    Pain in the chest (POA: Unknown)    Lesion of bone of thoracic spine (POA: Unknown)    Dyslipidemia (POA: Unknown)    Primary hypertension (POA: Unknown)    Emphysema lung (HCC) (POA: Unknown)  Resolved Problems:    * No resolved hospital problems. *      Hospital Summary (Brief Narrative):       73 y.o. male who presented 11/12/2023 with previous history of dyslipidemia, hypertension, and obesity.  He also has a history of smoking, but quit smoking 40 years ago.  He was employed as a  and is now retired.  He does not drink      In [place] patient was evaluated with chest x-ray lab and EKG.  EKG shows left bundle branch, but no acute ischemic changes.      Here patient has had repeat work-up with the addition of CTA chest.  CT has been negative for PE, but did demonstrate some emphysematous changes, and a T9 sclerotic lesion which is worrisome for neoplastic process.      Patient had work-up including MPI which showed previous infarct but no new reversible ischemia.  He had an echo which showed EF of 60%.  His chest pain completely resolved.  The case was discussed with cardiologist Dr. Adam who felt it was appropriate for patient to follow-up outpatient in clinic.  Patient also agrees to follow-up with PCP for further work-up of T9 sclerotic lesion to rule out malignancy.  He will also keep a blood pressure log at home and bring it to his PCP.    Patient was found to have emphysema incidentally which is likely due to his smoking.  However, an alpha 1 antitrypsin level was checked given that he has significant family history of emphysema.  This is pending and can be followed up with his PCP as well.  He was found to  have hypoxia so oxygen was ordered.     The patient met 2-midnight criteria for an inpatient stay at the time of discharge.     Consultants:      None    Procedures:        None    Discharge Medications:           Medication List        START taking these medications        Instructions   predniSONE 20 MG Tabs  Commonly known as: Deltasone   Doctor's comments: Quantity per pharm pref  Take 1 tablet (20mg) by mouth once a day for 5 days, then 1/2 tablet (10 mg) by mouth once a day for 6 days            CONTINUE taking these medications        Instructions   aspirin 81 MG Chew chewable tablet  Commonly known as: Asa   Chew 81 mg every day.  Dose: 81 mg     fish oil 1000 MG Caps capsule   Take 1,000 mg by mouth 3 times a day with meals.  Dose: 1,000 mg     lisinopril 40 MG tablet  Commonly known as: Prinivil   Take 1 Tablet by mouth every evening.  Dose: 40 mg     multivitamin Tabs   Take 1 Tablet by mouth every day.  Dose: 1 Tablet     Pravachol 40 MG tablet  Generic drug: pravastatin   Take 40 mg by mouth every evening.  Dose: 40 mg     vitamin D3 1000 Unit (25 mcg) Tabs  Commonly known as: Cholecalciferol   Take 1,000 Units by mouth every day.  Dose: 1,000 Units            Disposition:   Discharge home    Activity:   As tolerated    Code status:   Full code    Primary Care Provider:    Pcp Pt States None    Follow up appointment details :      [address] [address]  [phone]  Follow up on 11/17/2023  ?[address] [address]  [phone]  Monday-Friday 8am-5pm, closed for lunch 12pm-1pm  Sliding Scale    No future appointments.    Pending Studies:        Alpha-1 antitrypsin level    Time spent on discharge day patient visit: 44 minutes    #################################################    Interval history/exam for day of discharge:    Vitals:    11/14/23 0722 11/14/23 1045 11/14/23 1202 11/14/23 1552  "  BP: 138/70  123/58 124/57   Pulse: 98 95 78 62   Resp: 17 16 14 17   Temp: 36.7 °C (98.1 °F)  37 °C (98.6 °F) 36.7 °C (98 °F)   TempSrc: Temporal  Temporal Temporal   SpO2: 92% 92% 89% 90%   Weight:       Height:         Weight/BMI: Body mass index is 36.32 kg/m².  Pulse Oximetry: 90 %, O2 (LPM): 2, O2 Delivery Device: Nasal Cannula    Most Recent Labs:    Lab Results   Component Value Date/Time    WBC 9.4 11/14/2023 03:50 AM    RBC 4.37 (L) 11/14/2023 03:50 AM    HEMOGLOBIN 14.8 11/14/2023 03:50 AM    HEMATOCRIT 43.9 11/14/2023 03:50 AM    .5 (H) 11/14/2023 03:50 AM    MCH 33.9 (H) 11/14/2023 03:50 AM    MCHC 33.7 11/14/2023 03:50 AM    MPV 10.2 11/14/2023 03:50 AM    NEUTSPOLYS 61.20 11/12/2023 06:09 AM    LYMPHOCYTES 22.60 11/12/2023 06:09 AM    MONOCYTES 13.80 (H) 11/12/2023 06:09 AM    EOSINOPHILS 1.80 11/12/2023 06:09 AM    BASOPHILS 0.40 11/12/2023 06:09 AM      Lab Results   Component Value Date/Time    SODIUM 141 11/14/2023 03:50 AM    POTASSIUM 4.3 11/14/2023 03:50 AM    CHLORIDE 107 11/14/2023 03:50 AM    CO2 26 11/14/2023 03:50 AM    GLUCOSE 98 11/14/2023 03:50 AM    BUN 26 (H) 11/14/2023 03:50 AM    CREATININE 0.80 11/14/2023 03:50 AM      Lab Results   Component Value Date/Time    ALTSGPT 17 11/12/2023 06:09 AM    ASTSGOT 16 11/12/2023 06:09 AM    ALKPHOSPHAT 68 11/12/2023 06:09 AM    TBILIRUBIN 0.9 11/12/2023 06:09 AM    LIPASE 24 11/12/2023 06:09 AM    ALBUMIN 4.2 11/12/2023 06:09 AM    GLOBULIN 2.9 11/12/2023 06:09 AM     No results found for: \"PROTHROMBTM\", \"INR\"     Imaging/ Testing:      EC-ECHOCARDIOGRAM COMPLETE W/ CONT   Final Result      NM-CARDIAC STRESS TEST   Final Result      CT-CTA CHEST PULMONARY ARTERY W/ RECONS   Final Result         1. No pulmonary.   2. Bibasilar airspace disease.   3. Cardiomegaly.   4. Sclerosis within the T9 vertebral body, which could represent metastatic disease, lymphoma, or other.   5. Emphysema.            OUTSIDE IMAGES-CT CHEST   Final Result    "   OUTSIDE IMAGES-DX CHEST   Final Result          Instructions:      The patient was instructed to return to the ER in the event of worsening symptoms. I have counseled the patient on the importance of compliance and the patient has agreed to proceed with all medical recommendations and follow up plan indicated above.   The patient understands that all medications come with benefits and risks. Risks may include permanent injury or death and these risks can be minimized with close reassessment and monitoring.

## 2023-11-15 NOTE — PROGRESS NOTES
Discharge instructions and prescriptions given to patient. Questions and concerns addressed and answered. Heart monitor removed. Monitor room notified. IV's removed. Patient has all belongings at discharge. Down to car with CNA.

## 2024-02-13 ASSESSMENT — ENCOUNTER SYMPTOMS
RESPIRATORY SYMPTOMS COMMENTS: YES
WHEEZING: 1
DYSPNEA AT REST: 0
SHORTNESS OF BREATH: 1
CHEST TIGHTNESS: 1
HEMOPTYSIS: 0

## 2024-02-16 ENCOUNTER — OFFICE VISIT (OUTPATIENT)
Dept: SLEEP MEDICINE | Facility: MEDICAL CENTER | Age: 74
End: 2024-02-16
Attending: STUDENT IN AN ORGANIZED HEALTH CARE EDUCATION/TRAINING PROGRAM
Payer: MEDICARE

## 2024-02-16 VITALS
WEIGHT: 232 LBS | HEIGHT: 67 IN | HEART RATE: 54 BPM | SYSTOLIC BLOOD PRESSURE: 102 MMHG | OXYGEN SATURATION: 92 % | DIASTOLIC BLOOD PRESSURE: 60 MMHG | BODY MASS INDEX: 36.41 KG/M2

## 2024-02-16 DIAGNOSIS — G47.33 OSA (OBSTRUCTIVE SLEEP APNEA): ICD-10-CM

## 2024-02-16 DIAGNOSIS — J43.9 PULMONARY EMPHYSEMA, UNSPECIFIED EMPHYSEMA TYPE (HCC): ICD-10-CM

## 2024-02-16 DIAGNOSIS — R91.8 OTHER NONSPECIFIC ABNORMAL FINDING OF LUNG FIELD: ICD-10-CM

## 2024-02-16 PROCEDURE — 3074F SYST BP LT 130 MM HG: CPT | Performed by: STUDENT IN AN ORGANIZED HEALTH CARE EDUCATION/TRAINING PROGRAM

## 2024-02-16 PROCEDURE — 3078F DIAST BP <80 MM HG: CPT | Performed by: STUDENT IN AN ORGANIZED HEALTH CARE EDUCATION/TRAINING PROGRAM

## 2024-02-16 PROCEDURE — 99204 OFFICE O/P NEW MOD 45 MIN: CPT | Performed by: STUDENT IN AN ORGANIZED HEALTH CARE EDUCATION/TRAINING PROGRAM

## 2024-02-16 PROCEDURE — 94761 N-INVAS EAR/PLS OXIMETRY MLT: CPT | Performed by: STUDENT IN AN ORGANIZED HEALTH CARE EDUCATION/TRAINING PROGRAM

## 2024-02-16 PROCEDURE — 99213 OFFICE O/P EST LOW 20 MIN: CPT | Performed by: STUDENT IN AN ORGANIZED HEALTH CARE EDUCATION/TRAINING PROGRAM

## 2024-02-16 RX ORDER — TIOTROPIUM BROMIDE INHALATION SPRAY 3.12 UG/1
5 SPRAY, METERED RESPIRATORY (INHALATION) DAILY
Qty: 4 G | Refills: 6 | Status: SHIPPED | OUTPATIENT
Start: 2024-02-16 | End: 2024-02-16

## 2024-02-16 RX ORDER — FLUTICASONE PROPIONATE AND SALMETEROL 50; 100 UG/1; UG/1
1 POWDER RESPIRATORY (INHALATION) 2 TIMES DAILY
COMMUNITY
Start: 2023-12-14 | End: 2024-02-16

## 2024-02-16 RX ORDER — FLUTICASONE PROPIONATE AND SALMETEROL 250; 50 UG/1; UG/1
1 POWDER RESPIRATORY (INHALATION) 2 TIMES DAILY
Qty: 60 EACH | Refills: 0 | Status: SHIPPED | OUTPATIENT
Start: 2024-02-16 | End: 2024-03-01 | Stop reason: SDUPTHER

## 2024-02-16 ASSESSMENT — PATIENT HEALTH QUESTIONNAIRE - PHQ9: CLINICAL INTERPRETATION OF PHQ2 SCORE: 0

## 2024-02-16 ASSESSMENT — FIBROSIS 4 INDEX: FIB4 SCORE: 1.36

## 2024-02-17 NOTE — PROGRESS NOTES
Pulmonary Clinic Note    Chief Complaint:  Chief Complaint   Patient presents with    Establish Care     Referred by Yousif GARCIA for Emphysema, unspecified    Other     CT-CTA chest 23     HPI:   Jovany Castillo is a very pleasant 73 y.o. male with history of tobacco smoking (quit 40 yrs ago), was a  who presents to pulmonary clinic for emphysema. A1AT level wnl.   Patient was also has 2023 for chest pain-underwent cardiac workup and CT which showed emphysema.  Patient was also noted to be hypoxic and started on supplemental O2.  Patient reports dyspnea with exertion as well as an intermittent cough with sputum production.  mMRC 2-3.  Also with symptoms of sleep apnea.      Past Medical History:   Diagnosis Date    Back pain     Chickenpox     COPD (chronic obstructive pulmonary disease) (HCC)     Cough     Daytime sleepiness     Difficulty swallowing     [place] measles     Hearing difficulty     Hyperlipidemia     Hypertension     Mumps     Ringing in ears     Shortness of breath     Tonsillitis     Wears glasses     Wheezing        Past Surgical History:   Procedure Laterality Date    LAMINOTOMY         Social History     Socioeconomic History    Marital status:      Spouse name: Not on file    Number of children: Not on file    Years of education: Not on file    Highest education level: Not on file   Occupational History    Not on file   Tobacco Use    Smoking status: Former     Current packs/day: 0.00     Average packs/day: 1 pack/day for 18.0 years (18.0 ttl pk-yrs)     Types: Cigarettes     Start date: 1963     Quit date: 1981     Years since quittin.1     Passive exposure: Never    Smokeless tobacco: Never   Vaping Use    Vaping Use: Never used   Substance and Sexual Activity    Alcohol use: Yes     Alcohol/week: 1.8 oz     Types: 3 Cans of beer per week     Comment: On the weekends 3 a day. In the past a heavier drinker at ti    Drug use: Never    Sexual  activity: Not on file   Other Topics Concern    Not on file   Social History Narrative    Not on file     Social Determinants of Health     Financial Resource Strain: Not on file   Food Insecurity: Not on file   Transportation Needs: Not on file   Physical Activity: Not on file   Stress: Not on file   Social Connections: Not on file   Intimate Partner Violence: Not on file   Housing Stability: Not on file          Family History   Problem Relation Age of Onset    Hypertension Mother     Stroke Mother     Hypertension Father         Massive heart attack    Sleep Apnea Brother         Son not brother     Current Outpatient Medications on File Prior to Visit   Medication Sig Dispense Refill    lisinopril (PRINIVIL) 10 MG Tab Take 1 Tablet by mouth every day. 30 Tablet 2    pravastatin (PRAVACHOL) 40 MG tablet Take 40 mg by mouth every evening.      multivitamin Tab Take 1 Tablet by mouth every day.      Omega-3 Fatty Acids (FISH OIL) 1000 MG Cap capsule Take 1,000 mg by mouth 3 times a day with meals.      aspirin (ASA) 81 MG Chew Tab chewable tablet Chew 81 mg every day.      vitamin D3 (CHOLECALCIFEROL) 1000 Unit (25 mcg) Tab Take 1,000 Units by mouth every day.      predniSONE (DELTASONE) 20 MG Tab Take 1 tablet (20mg) by mouth once a day for 5 days, then 1/2 tablet (10 mg) by mouth once a day for 6 days 8 Tablet 0     No current facility-administered medications on file prior to visit.       Allergies: Patient has no known allergies.      ROS:   Review of Systems   Constitutional:  Negative for chills and fever.   HENT:  Negative for hearing loss.    Eyes:  Negative for discharge.   Respiratory:  Positive for cough, sputum production, shortness of breath and wheezing. Negative for hemoptysis.    Cardiovascular:  Negative for chest pain.   Gastrointestinal:  Negative for nausea and vomiting.   Musculoskeletal:  Negative for falls.   Skin:  Negative for rash.   Neurological:  Negative for focal weakness.  "      Vitals:  /60 (BP Location: Left arm, Patient Position: Sitting, BP Cuff Size: Adult)   Pulse (!) 54   Ht 1.702 m (5' 7\")   Wt 105 kg (232 lb)   SpO2 92%     Physical Exam:  Physical Exam  Vitals and nursing note reviewed.   Constitutional:       General: He is not in acute distress.     Appearance: Normal appearance. He is not ill-appearing or toxic-appearing.   HENT:      Head: Normocephalic and atraumatic.      Nose: Nose normal.      Mouth/Throat:      Mouth: Mucous membranes are moist.   Eyes:      General: No scleral icterus.     Conjunctiva/sclera: Conjunctivae normal.   Cardiovascular:      Rate and Rhythm: Normal rate and regular rhythm.   Pulmonary:      Effort: Pulmonary effort is normal. No respiratory distress.      Breath sounds: No wheezing, rhonchi or rales.   Abdominal:      Palpations: Abdomen is soft.   Musculoskeletal:         General: No deformity or signs of injury. Normal range of motion.      Cervical back: Normal range of motion.   Skin:     General: Skin is warm and dry.   Neurological:      General: No focal deficit present.      Mental Status: He is alert. Mental status is at baseline.   Psychiatric:         Mood and Affect: Mood normal.         Behavior: Behavior normal.         Data:    Reviewed    Assessment/Plan:    Problem List Items Addressed This Visit          Pulmonary/Sleep Medicine Problems    Emphysema lung (HCC)     -PFTs  -Start Advair  -As needed albuterol           Relevant Medications    fluticasone-salmeterol (ADVAIR DISKUS) 250-50 MCG/ACT AEROSOL POWDER, BREATH ACTIVATED    Other Relevant Orders    PULMONARY FUNCTION TESTS -Test requested: Complete Pulmonary Function Test    Multiple Oximetry    JJ (obstructive sleep apnea)     -Sleep referral         Relevant Orders    Referral to Pulmonary and Sleep Medicine     Other Visit Diagnoses       Other nonspecific abnormal finding of lung field        Relevant Orders    PULMONARY FUNCTION TESTS -Test " requested: Complete Pulmonary Function Test            Return in about 3 months (around 5/16/2024).     This note was generated using voice recognition software which has a chance of producing errors of grammar and possibly content.  I have made every reasonable attempt to find and correct any obvious errors, but it should be expected that some may not be found prior to finalization of this note.    Time spent in record review prior to patient arrival, reviewing results, and in face-to-face encounter totaled 45 min, excluding any procedures if performed.    Alondra Gaytan MD  Pulmonary and Critical Care Medicine  [facility]

## 2024-02-19 PROBLEM — G47.33 OSA (OBSTRUCTIVE SLEEP APNEA): Status: ACTIVE | Noted: 2024-02-19

## 2024-02-19 ASSESSMENT — ENCOUNTER SYMPTOMS
HEMOPTYSIS: 0
NAUSEA: 0
SHORTNESS OF BREATH: 1
EYE DISCHARGE: 0
FALLS: 0
FEVER: 0
FOCAL WEAKNESS: 0
VOMITING: 0
COUGH: 1
CHILLS: 0
SPUTUM PRODUCTION: 1
WHEEZING: 1

## 2024-02-29 ENCOUNTER — NON-PROVIDER VISIT (OUTPATIENT)
Dept: SLEEP MEDICINE | Facility: MEDICAL CENTER | Age: 74
End: 2024-02-29
Attending: STUDENT IN AN ORGANIZED HEALTH CARE EDUCATION/TRAINING PROGRAM
Payer: MEDICARE

## 2024-02-29 VITALS — BODY MASS INDEX: 36.22 KG/M2 | WEIGHT: 230.8 LBS | HEIGHT: 67 IN

## 2024-02-29 DIAGNOSIS — R91.8 OTHER NONSPECIFIC ABNORMAL FINDING OF LUNG FIELD: ICD-10-CM

## 2024-02-29 DIAGNOSIS — J43.9 PULMONARY EMPHYSEMA, UNSPECIFIED EMPHYSEMA TYPE (HCC): ICD-10-CM

## 2024-02-29 PROCEDURE — 94060 EVALUATION OF WHEEZING: CPT | Mod: 26 | Performed by: INTERNAL MEDICINE

## 2024-02-29 PROCEDURE — 94729 DIFFUSING CAPACITY: CPT | Mod: 26 | Performed by: INTERNAL MEDICINE

## 2024-02-29 PROCEDURE — 94729 DIFFUSING CAPACITY: CPT | Performed by: STUDENT IN AN ORGANIZED HEALTH CARE EDUCATION/TRAINING PROGRAM

## 2024-02-29 PROCEDURE — 94726 PLETHYSMOGRAPHY LUNG VOLUMES: CPT | Performed by: STUDENT IN AN ORGANIZED HEALTH CARE EDUCATION/TRAINING PROGRAM

## 2024-02-29 PROCEDURE — 94060 EVALUATION OF WHEEZING: CPT | Performed by: STUDENT IN AN ORGANIZED HEALTH CARE EDUCATION/TRAINING PROGRAM

## 2024-02-29 PROCEDURE — 94726 PLETHYSMOGRAPHY LUNG VOLUMES: CPT | Mod: 26 | Performed by: INTERNAL MEDICINE

## 2024-02-29 ASSESSMENT — PULMONARY FUNCTION TESTS
FVC_PERCENT_PREDICTED: 74
FEV1_PREDICTED: 2.78
FEV1/FVC_PERCENT_CHANGE: 2
FEV1_PERCENT_CHANGE: 3
FEV1/FVC_PERCENT_PREDICTED: 99
FEV1/FVC_PERCENT_PREDICTED: 98
FEV1_PERCENT_CHANGE: 1
FEV1/FVC: 75
FEV1/FVC_PERCENT_PREDICTED: 95
FEV1_LLN: 2.32
FVC_LLN: 3.07
FEV1_PERCENT_PREDICTED: 75
FEV1/FVC_PERCENT_CHANGE: 300
FVC_PREDICTED: 3.68
FEV1/FVC: 73
FEV1/FVC_PREDICTED: 76
FEV1/FVC_PERCENT_PREDICTED: 97
FEV1: 2
FEV1/FVC: 74.47
FVC_PERCENT_PREDICTED: 76
FEV1/FVC: 73
FVC: 2.75
FEV1_PERCENT_PREDICTED: 72
FEV1: 2.1
FVC: 2.82
FEV1/FVC_PERCENT_LLN: 63
FEV1/FVC_PERCENT_PREDICTED: 76

## 2024-02-29 ASSESSMENT — FIBROSIS 4 INDEX: FIB4 SCORE: 1.36

## 2024-03-01 ENCOUNTER — OFFICE VISIT (OUTPATIENT)
Dept: SLEEP MEDICINE | Facility: MEDICAL CENTER | Age: 74
End: 2024-03-01
Attending: NURSE PRACTITIONER
Payer: MEDICARE

## 2024-03-01 VITALS
OXYGEN SATURATION: 94 % | HEART RATE: 56 BPM | WEIGHT: 230 LBS | BODY MASS INDEX: 36.1 KG/M2 | HEIGHT: 67 IN | DIASTOLIC BLOOD PRESSURE: 62 MMHG | RESPIRATION RATE: 16 BRPM | SYSTOLIC BLOOD PRESSURE: 120 MMHG

## 2024-03-01 DIAGNOSIS — J43.9 PULMONARY EMPHYSEMA, UNSPECIFIED EMPHYSEMA TYPE (HCC): ICD-10-CM

## 2024-03-01 PROCEDURE — 99214 OFFICE O/P EST MOD 30 MIN: CPT | Performed by: NURSE PRACTITIONER

## 2024-03-01 PROCEDURE — 3078F DIAST BP <80 MM HG: CPT | Performed by: NURSE PRACTITIONER

## 2024-03-01 PROCEDURE — 99212 OFFICE O/P EST SF 10 MIN: CPT | Performed by: NURSE PRACTITIONER

## 2024-03-01 PROCEDURE — 3074F SYST BP LT 130 MM HG: CPT | Performed by: NURSE PRACTITIONER

## 2024-03-01 RX ORDER — FLUTICASONE PROPIONATE AND SALMETEROL 250; 50 UG/1; UG/1
1 POWDER RESPIRATORY (INHALATION) 2 TIMES DAILY
Qty: 60 EACH | Refills: 5 | Status: SHIPPED | OUTPATIENT
Start: 2024-03-01 | End: 2024-03-01

## 2024-03-01 RX ORDER — FLUTICASONE PROPIONATE AND SALMETEROL 250; 50 UG/1; UG/1
1 POWDER RESPIRATORY (INHALATION) 2 TIMES DAILY
Qty: 60 EACH | Refills: 5 | Status: SHIPPED
Start: 2024-03-01 | End: 2024-03-01

## 2024-03-01 RX ORDER — FLUTICASONE PROPIONATE AND SALMETEROL 250; 50 UG/1; UG/1
1 POWDER RESPIRATORY (INHALATION) 2 TIMES DAILY
Qty: 60 EACH | Refills: 5 | Status: SHIPPED | OUTPATIENT
Start: 2024-03-01

## 2024-03-01 ASSESSMENT — FIBROSIS 4 INDEX: FIB4 SCORE: 1.36

## 2024-03-01 NOTE — PROGRESS NOTES
Chief Complaint   Patient presents with    Follow-Up     LAST SEEN 02/16/2024 HUSSEIN HANCOCK    Pft results       HPI:  Jovany Castillo is a 73 y.o. year old male here today for follow-up on PD and PFT results.  Last seen 2/16/2024 for initial consult.  Patient was hospitalized in November 2023 for chest pain-underwent cardiac workup and CT which showed emphysema.  Patient was also noted to be hypoxic and started on supplemental O2.  Patient reports dyspnea with exertion as well as an intermittent cough with sputum production.  mMRC 2-3.  Also with symptoms of sleep apnea.  She was referred to sleep clinic with an upcoming appointment on March 25, 2024 with Dr. Fletcher.    Starting oxygen, patient feels that his symptoms have improved significantly.  Denies cough, chest congestion, chest tightness, new or worsening dyspnea or shortness of breath.  PFTs done 2/29/2024 showed no evidence of obstruction, no change postbronchodilator admission, FEV1 and FVC show evidence of restriction, but is secondary to body habitus with a ERV of 18% predicted.  Total lung capacity and residual volume are within normal limits, residual volume does show mild air trapping.  Diffusion capacity is preserved.  Current regimen includes Advair discus and a rescue inhaler which is rarely used.      ROS: As per HPI and otherwise negative if not stated.    Past Medical History:   Diagnosis Date    Back pain     Chickenpox     COPD (chronic obstructive pulmonary disease) (HCC)     Cough     Daytime sleepiness     Difficulty swallowing     [place] measles     Hearing difficulty     Hyperlipidemia     Hypertension     Mumps     Ringing in ears     Shortness of breath     Tonsillitis     Wears glasses     Wheezing        Past Surgical History:   Procedure Laterality Date    LAMINOTOMY         Family History   Problem Relation Age of Onset    Hypertension Mother     Stroke Mother     Hypertension Father         Massive heart attack    Sleep Apnea  "Brother         Son not brother       Allergies as of 03/01/2024    (No Known Allergies)        Vitals:  /62 (BP Location: Left arm, Patient Position: Sitting, BP Cuff Size: Adult)   Pulse (!) 56   Resp 16   Ht 1.702 m (5' 7\")   Wt 104 kg (230 lb)   SpO2 94%     Current medications as of today   Current Outpatient Medications   Medication Sig Dispense Refill    fluticasone-salmeterol (ADVAIR DISKUS) 250-50 MCG/ACT AEROSOL POWDER, BREATH ACTIVATED Inhale 1 Puff 2 times a day. Rinse mouth after each use. 60 Each 5    lisinopril (PRINIVIL) 10 MG Tab Take 1 Tablet by mouth every day. 30 Tablet 2    pravastatin (PRAVACHOL) 40 MG tablet Take 40 mg by mouth every evening.      multivitamin Tab Take 1 Tablet by mouth every day.      Omega-3 Fatty Acids (FISH OIL) 1000 MG Cap capsule Take 1,000 mg by mouth 3 times a day with meals.      aspirin (ASA) 81 MG Chew Tab chewable tablet Chew 81 mg every day.      vitamin D3 (CHOLECALCIFEROL) 1000 Unit (25 mcg) Tab Take 1,000 Units by mouth every day.       No current facility-administered medications for this visit.         Physical Exam:   Gen:           Alert and oriented, No apparent distress. Mood and affect appropriate, normal interaction with examiner.  Eyes:          PERRL, EOM intact, sclere white, conjunctive moist.  Ears:          Not examined.   Hearing:     Grossly intact.  Nose:          Normal, no lesions or deformities.  Dentition:    Good dentition.  Oropharynx:   Tongue normal, posterior pharynx without erythema or exudate.  Neck:        Supple, trachea midline, no masses.  Respiratory Effort: No intercostal retractions or use of accessory muscles.   Lung Auscultation:      Clear to auscultation bilaterally; no rales, rhonchi or wheezing.  CV:            Regular rate and rhythm. No murmurs, rubs or gallops.  Abd:           Not examined.   Lymphadenopathy: Not examined.  Gait and Station: Normal.  Digits and Nails: No clubbing, cyanosis, petechiae, or " nodes.   Cranial Nerves: II-XII grossly intact.  Skin:        No rashes, lesions or ulcers noted.               Ext:           No cyanosis or edema.      Assessment:  1. Pulmonary emphysema, unspecified emphysema type (HCC)  fluticasone-salmeterol (ADVAIR DISKUS) 250-50 MCG/ACT AEROSOL POWDER, BREATH ACTIVATED    DISCONTINUED: fluticasone-salmeterol (ADVAIR DISKUS) 250-50 MCG/ACT AEROSOL POWDER, BREATH ACTIVATED    DISCONTINUED: fluticasone-salmeterol (ADVAIR DISKUS) 250-50 MCG/ACT AEROSOL POWDER, BREATH ACTIVATED    DISCONTINUED: fluticasone-salmeterol (ADVAIR DISKUS) 250-50 MCG/ACT AEROSOL POWDER, BREATH ACTIVATED    DISCONTINUED: fluticasone-salmeterol (ADVAIR DISKUS) 250-50 MCG/ACT AEROSOL POWDER, BREATH ACTIVATED        Plan:  Continue current regimen of Advair and albuterol as needed.  PFT shows no significant obstruction with slight air trapping and preserved DLCO.  Patient will follow-up in 6 months.  Recommended continuing consult to sleep medicine with potential sleep study and reassess after patient has started on PAP therapy if necessary.    Please note that this dictation was created using voice recognition software. I have made every reasonable attempt to correct obvious errors, but it is possible there are errors of grammar and possibly content that I did not discover before finalizing the note.

## 2024-03-01 NOTE — PROCEDURES
Tech: Elle London, RT  Good patient effort & cooperation.  Test was performed on the [employer] Body Plethysmograph- Elite DX system.  The predicted sets used for Spirometry are GLI-2012, for Lung Volumes are ITS, and for DLCO is GLI 2017.  The results of this test meet the ATS standards for acceptability and repeatability.  The DLCO was uncorrected for Hb.  A bronchodilator of Ventolin HFA 2 puffs via spacer was administered.  DLCO was performed during dilation period.    Interpretation:  Spirometry demonstrates a mild reduction in the mid flow rates with a significant bronchodilator response, consistent with the reported diagnosis of emphysema.    Lung volumes demonstrate an elevated RV and RV/TLC ratio, suggestive of air trapping.    Diffusion capacity is preserved.    Flow volume loop is consistent with the above interpretation.    No prior PFTs for comparison.    IJovany M.D. am the author of this note (PFT interpretation).  __________  Jovany Matos MD  Pulmonary and Critical Care Medicine  [facility]

## 2024-03-25 ENCOUNTER — OFFICE VISIT (OUTPATIENT)
Dept: SLEEP MEDICINE | Facility: MEDICAL CENTER | Age: 74
End: 2024-03-25
Attending: STUDENT IN AN ORGANIZED HEALTH CARE EDUCATION/TRAINING PROGRAM
Payer: MEDICARE

## 2024-03-25 VITALS
SYSTOLIC BLOOD PRESSURE: 118 MMHG | WEIGHT: 232.1 LBS | RESPIRATION RATE: 16 BRPM | DIASTOLIC BLOOD PRESSURE: 58 MMHG | OXYGEN SATURATION: 93 % | BODY MASS INDEX: 36.43 KG/M2 | HEIGHT: 67 IN | HEART RATE: 51 BPM

## 2024-03-25 DIAGNOSIS — J43.9 PULMONARY EMPHYSEMA, UNSPECIFIED EMPHYSEMA TYPE (HCC): ICD-10-CM

## 2024-03-25 DIAGNOSIS — G47.33 OSA (OBSTRUCTIVE SLEEP APNEA): ICD-10-CM

## 2024-03-25 DIAGNOSIS — G47.30 SLEEP DISORDER BREATHING: Primary | ICD-10-CM

## 2024-03-25 DIAGNOSIS — J96.11 CHRONIC RESPIRATORY FAILURE WITH HYPOXIA (HCC): ICD-10-CM

## 2024-03-25 DIAGNOSIS — J96.01 ACUTE RESPIRATORY FAILURE WITH HYPOXIA (HCC): ICD-10-CM

## 2024-03-25 PROCEDURE — 99214 OFFICE O/P EST MOD 30 MIN: CPT | Performed by: STUDENT IN AN ORGANIZED HEALTH CARE EDUCATION/TRAINING PROGRAM

## 2024-03-25 PROCEDURE — 3074F SYST BP LT 130 MM HG: CPT | Performed by: STUDENT IN AN ORGANIZED HEALTH CARE EDUCATION/TRAINING PROGRAM

## 2024-03-25 PROCEDURE — 3078F DIAST BP <80 MM HG: CPT | Performed by: STUDENT IN AN ORGANIZED HEALTH CARE EDUCATION/TRAINING PROGRAM

## 2024-03-25 PROCEDURE — 99212 OFFICE O/P EST SF 10 MIN: CPT | Performed by: STUDENT IN AN ORGANIZED HEALTH CARE EDUCATION/TRAINING PROGRAM

## 2024-03-25 ASSESSMENT — FIBROSIS 4 INDEX: FIB4 SCORE: 1.36

## 2024-03-25 NOTE — PROGRESS NOTES
[facility] Consult Note     Date: 3/25/2024 / Time: 7:49 AM      Thank you for requesting a sleep medicine consultation on Jovany Castillo at the sleep center. Presents today with the chief complaints of chronic respiratory failure, history of snoring. He is referred by Alondra Gaytan M.D.  [address] [place] [address] for evaluation and treatment of sleep disorder breathing .     HISTORY OF PRESENT ILLNESS:     Jovany Castillo is a 73 y.o. male with chronic respiratory failure on supplemental oxygen, emphysema, obesity, hypertension, and history of snoring.  Presents to Sleep Clinic for evaluation of sleep.    He is currently on supplemental oxygen 2-1/2 to 3 L/min 24 hours a day.  He was placed on this last fall.    He reports since being on oxygen his snoring has improved.  He states ideally he would like to be off oxygen during the day.  His wife reports that he does unintentionally fall asleep while watching TV almost daily.  He states he has no trouble with excessive daytime sleepiness or falling asleep and staying asleep at night.  He does have low energy at times during the day.  He does have occasional difficulty with concentration and memory.  Denies any drowsiness while driving.    His son has a obstructive sleep apnea and uses CPAP.    As per supplemental questionnaire to be scanned or imported into chart:    [place] Sleepiness Score: 3    Sleep Schedule  Bedtime: Weekday & Weekend 12am  Wake time: Weekday & Weekend 7-8am  Sleep-onset latency: mins   Awakenings from sleep: 1  Difficulty falling back asleep: No  Bedroom partner: yes   Naps: almost daily unintentionally     DAYTIME SYMPTOMS:   Excessive daytime sleepiness: No   Daytime fatigue: Yes  Difficulty concentrating: Yes  Memory problems: Yes  Irritability:No   Work/school performance issues: No   Sleepiness with driving: No   Caffeine/stimulant use: Yes 3 cups a day   Alcohol use:Yes, How Many? 5-6 beers  "a week      SLEEP RELATED SYMPTOMS  Snoring: Yes not since oxygen   Witnessed apnea or gasping/choking: No   Dry mouth or mouth breathing: Yes  Sweating: No   Teeth grinding/biting: No   Morning headaches: No   Refreshed Upon Awakening: Yes     SLEEP RELATED BEHAVIORS:  Parasomnias (walking, talking, eating, violence): No   Leg kicking: No   Restless legs - \"urge to move\": No   Nightmares: No  Recurrent: No   Dream enactment: No      NARCOLEPSY:  Cataplexy: No   Sleep paralysis: No   Sleep attacks: No   Hypnagogic/hypnopompic hallucinations: No     MEDICAL HISTORY  Past Medical History:   Diagnosis Date    Back pain     Chickenpox     COPD (chronic obstructive pulmonary disease) ([facility])     Cough     Daytime sleepiness     Difficulty swallowing     [place] measles     Hearing difficulty     Hyperlipidemia     Hypertension     Mumps     Ringing in ears     Shortness of breath     Tonsillitis     Wears glasses     Wheezing         SURGICAL HISTORY  Past Surgical History:   Procedure Laterality Date    LAMINOTOMY          FAMILY HISTORY  Family History   Problem Relation Age of Onset    Hypertension Mother     Stroke Mother     Hypertension Father         Massive heart attack    Sleep Apnea Brother         Son not brother       SOCIAL HISTORY  Social History     Socioeconomic History    Marital status:    Tobacco Use    Smoking status: Former     Current packs/day: 0.00     Average packs/day: 1 pack/day for 18.0 years (18.0 ttl pk-yrs)     Types: Cigarettes     Start date: 1963     Quit date: 1981     Years since quittin.2     Passive exposure: Never    Smokeless tobacco: Never   Vaping Use    Vaping Use: Never used   Substance and Sexual Activity    Alcohol use: Yes     Alcohol/week: 3.0 - 3.6 oz     Types: 5 - 6 Cans of beer per week     Comment: On the weekends 3 a day. In the past a heavier drinker at ti    Drug use: Never        Occupation: Retired     CURRENT MEDICATIONS  Current Outpatient " "Medications   Medication Sig Dispense Refill    fluticasone-salmeterol (ADVAIR DISKUS) 250-50 MCG/ACT AEROSOL POWDER, BREATH ACTIVATED Inhale 1 Puff 2 times a day. Rinse mouth after each use. 60 Each 5    lisinopril (PRINIVIL) 10 MG Tab Take 1 Tablet by mouth every day. 30 Tablet 2    pravastatin (PRAVACHOL) 40 MG tablet Take 40 mg by mouth every evening.      multivitamin Tab Take 1 Tablet by mouth every day.      Omega-3 Fatty Acids (FISH OIL) 1000 MG Cap capsule Take 1,000 mg by mouth 3 times a day with meals.      aspirin (ASA) 81 MG Chew Tab chewable tablet Chew 81 mg every day.      vitamin D3 (CHOLECALCIFEROL) 1000 Unit (25 mcg) Tab Take 1,000 Units by mouth every day.       No current facility-administered medications for this visit.       REVIEW OF SYSTEMS  Constitutional: Denies fevers, Denies weight changes  Ears/Nose/Throat/Mouth: Denies nasal congestion or sore throat   Cardiovascular: Denies chest pain  Respiratory: Exertional shortness of breath, Denies cough  Gastrointestinal/Hepatic: Denies nausea, vomiting  Sleep: see HPI    Physical Examination:  Vitals/ General Appearance:   Weight/BMI: Body mass index is 36.35 kg/m².  /58 (BP Location: Left arm, Patient Position: Sitting, BP Cuff Size: Adult)   Pulse (!) 51   Resp 16   Ht 1.702 m (5' 7\")   Wt 105 kg (232 lb 1.6 oz)   SpO2 93%   Vitals:    03/25/24 0755   BP: 118/58   BP Location: Left arm   Patient Position: Sitting   BP Cuff Size: Adult   Pulse: (!) 51   Resp: 16   SpO2: 93%   Weight: 105 kg (232 lb 1.6 oz)   Height: 1.702 m (5' 7\")       Pt. is alert and oriented to time, place and person. Cooperative and in no apparent distress.     Constitutional: Alert, no distress, well-groomed.  Skin: No rashes in visible areas.  Eye: Round. Conjunctiva clear, lids normal. No icterus.   ENT EXAM  Nasal alae/valves collapsible: No   Nasal septum deviation: Yes  Nasal turbinate hypertrophy: Left: Grade 1   Right: Grade 1  Hard palate narrow: No "   Hard palate high: No   Soft palate/uvula (Mallampati score): 4  Tongue Scalloping: No   Retrognathia: No   Micrognathia: No   Cardiovascular:no murmus/gallops/rubs, normal S1 and S2 heart sounds, regular rate and rhythm  Pulmonary:Clear to auscultation, No wheezes, No crackles.  Neurologic:Awake, alert and oriented x 3, Normal age appropriate gait, No involuntary motions.  Extremities: No clubbing, cyanosis, or edema       ASSESSMENT AND PLAN   Jovany Castillo is a 73 y.o. male with chronic respiratory failure on supplemental oxygen, emphysema, obesity, hypertension, and history of snoring.  Presents to Sleep Clinic for evaluation of sleep.    1. Jovany Castillo  has symptoms of Obstructive Sleep Apnea (JJ). Jovany Castillo has symptoms of snoring, dry mouth.    Pt has risk factors for JJ include obesity, age, family hx, and crowded oropharynx.     The pathophysiology of JJ and the increased risk of cardiovascular morbidity from untreated JJ is discussed in detail with the patient. He  also has HTN, emphysema, chronic respiratory failure, which can be worsened by JJ.      We have discussed diagnostic options including in-laboratory, attended polysomnography and home sleep testing. We have also discussed treatment options including airway pressurization, reconstructive otolaryngologic surgery, dental appliances and weight management.     Subsequently,treatment options will be discussed based on the diagnostic study. Meanwhile, He is urged to avoid supine sleep, weight gain and alcoholic beverages since all of these can worsen JJ. He is cautioned against drowsy driving. If He feels sleepy while driving, advised must pull over for a break/nap, rather than persist on the road, in the interest of Pt's own safety and that of others on the road.    Plan  -  He  will be scheduled for an overnight PSG to assess sleep related breathing disorder.  - Follow up 1-2 weeks after sleep study to discuss results and treatment  options moving forward   -Advised to reach out via [employer] or by phone with any questions or concerns.     2.  Regarding treatment of other past medical problems and general health maintenance,  Pt is urged to follow up with PCP.      Please note portions of this record was created using voice recognition software. I have made every reasonable attempt to correct obvious errors, but I expect that there are errors of grammar and possibly content I did not discover before finalizing the note.

## 2024-04-10 ENCOUNTER — SLEEP STUDY (OUTPATIENT)
Dept: SLEEP MEDICINE | Facility: MEDICAL CENTER | Age: 74
End: 2024-04-10
Attending: STUDENT IN AN ORGANIZED HEALTH CARE EDUCATION/TRAINING PROGRAM
Payer: MEDICARE

## 2024-04-10 DIAGNOSIS — J43.9 PULMONARY EMPHYSEMA, UNSPECIFIED EMPHYSEMA TYPE (HCC): ICD-10-CM

## 2024-04-10 DIAGNOSIS — G47.30 SLEEP DISORDER BREATHING: ICD-10-CM

## 2024-04-10 DIAGNOSIS — J96.11 CHRONIC RESPIRATORY FAILURE WITH HYPOXIA (HCC): ICD-10-CM

## 2024-04-10 PROCEDURE — 95811 POLYSOM 6/>YRS CPAP 4/> PARM: CPT | Performed by: STUDENT IN AN ORGANIZED HEALTH CARE EDUCATION/TRAINING PROGRAM

## 2024-04-11 NOTE — PROCEDURES
MONTAGE: Standard  STUDY TYPE: Split Night  RECORDING TECHNIQUE:   After the scalp was prepared, gold plated electrodes were applied to the scalp according to the International 10-20 System. EEG (electroencephalogram) was continuously monitored from the O1-M2, O2-M1, C3-M2, C4-M1, F3-M2, and F4-M1. EOGs (electrooculograms) were monitored by electrodes placed at the left and right outer canthi. Chin EMG (electromyogram) was monitored by electrodes placed on the mentalis and sub-mentalis muscles. Nasal and oral airflow were monitored using a triple port thermocouple as well as oronasal pressure transducer. Respiratory effort was measured by inductive plethysmography technology employing abdominal and thoracic belts. Blood oxygen saturation and pulse were monitored by pulse oximetry. Heart rhythm was monitored by surface electrocardiogram. Leg EMG was studied using surface electrodes placed on left and right anterior tibialis. A microphone was used to monitor tracheal sounds and snoring. Body position was monitored and documented by technician observation.   SCORING CRITERIA:   A modification of the AASM manual for scoring of sleep and associated events was used. Obstructive apneas were scored by cessation of airflow for at least 10 seconds with continuing respiratory effort. Central apneas were scored by cessation of airflow for at least 10 seconds with no respiratory effort. Hypopneas were scored by a 30% or more reduction in airflow for at least 10 seconds accompanied by arterial oxygen desaturation of 3% or an arousal. For CMS (Medicare) patients, per AASM rule 1B, hypopneas are scored by 30% with mild reduction in airflow for at least 10 seconds accompanied by arterial saturation decreased at 4%.    DIAGNOSTIC  Study start time was 08:48:40 PM. Diagnostic recording time was 265 minutes with a total sleep time of 136 minutes resulting in a sleep efficiency of 51.51%%. Sleep latency from the start of the study was  122 minutes and the latency from sleep to REM was 71 minutes. In total,21 arousals were scored for an arousal index of 9.2.  Respiratory:  There were a total of 3 apneas consisting of 2 obstructive apneas, 0 mixed apneas, and 1 central apneas. A total of 41 hypopneas were scored. The apnea index was 1.32 per hour and the hypopnea index was 18.02 per hour resulting in an overall 4% AHI of 19.34. AHI during REM was 64.6 and AHI while supine was 19.34.  Oximetry:  There was a mean oxygen saturation of 85.0%. The minimum oxygen saturation during NREM sleep was 81.0% and in REM was 63.0%. Time spent during sleep with oxygen saturations <88% was 134.6 minutes.   Cardiac:  The highest heart rate seen while awake was 85 BPM while the highest heart rate during sleep was 66 BPM with an average sleeping heart rate of 52 BPM.  Limb Movements:  There were a total of 186 PLMs during sleep, which resulted in a PLM index of 81.8. There were 8 PLMs associated with arousals which resulted in a PLMS arousal index of 3.5.    TREATMENT:  Treatment recording time was 4h 35.5m (275 minutes) with a total sleep time of 2h 49.5m (169 minutes) resulting in a sleep efficiency of 61.5%. Sleep latency from the start of treatment was 23 minutes and REM latency from sleep onset was 1h 17.0m. The patient had 25 arousals in total for an arousal index of 8.8.  Respiratory:   There were 3 apneas in total consisting of 0 obstructive apneas, 3 central apneas, and 0 mixed apneas for an apnea index of 1.06. The patient had 7 hypopneas in total, which resulted in a hypopnea index of 2.48. The overall AHI was 3.54, with a REM AHI of 8.11, and a supine AHI of 4.40.   Oximetry:  The mean SaO2 during treatment was 84.0%. The minimum oxygen saturation in NREM was 80.0 % and in REM was 73.0%. Patient spent 169.0 minutes of TST with SaO2 <88%.  Cardiac:  The highest heart rate during sleep was 61 BPM with an average sleeping heart rate of 50BPM.  Limb  Movements:  There were a total of 78 PLMS during titration sleep time that resulted in an index of 27.6. There were 5 PLMS associated with arousals. This resulted in a PLM arousal index of 1.8.  Titration: CPAP was tried from 5-10cm H2O. Bipap was tried at 10/6cm h2O. Oxygen was added at 2LPM.  This was a fully attended sleep study. This test was technically adequate. This patient was titrated on CPAP starting at 5 cm of water pressure. Patient was titrated up to 10 cm of water pressure. Patient did best at 9 cm of water pressure. Patient spent 46 minutes at that pressure and the AHI was 3.87 which is considered treated obstructive sleep apnea.     Impression:  1.  Moderate obstructive sleep apnea with an overall 4% AHI of 19.34 events an hour  2.  Nocturnal hypoxia during both diagnostic and treatment portion of study  3.  PLM's noted during diagnostic portion ending treatment portion but appeared improve with CPAP  4.  Patient met criteria for split-night protocol was placed on CPAP therapy  5.  BiPAP was tried towards end of study but patient was already having difficulty getting back to sleep and no sleep was seen on BiPAP  6.  Despite improvement in respiratory events and overall AHI of 3.54 during treatment portion patient spent 169 minutes at or below 88% saturation    Recommendations:  I recommend CPAP 9 cmH2O with bleed and supplemental oxygen 2 L/min.  Patient used a medium DreamWear fullface mask during night of study.     I also recommend 30 day compliance download to assess the efficacy to the recommended pressure, measure leak, apnea hypopnea index and compliance for further outpatient monitoring and management of PAP therapy. In some cases alternative treatment options may be proven effective in resolving sleep apnea. These options include upper airway surgery, the use of a dental orthotic, weight loss, or positional therapy. Clinical correlation is required. In general patients with sleep apnea are  advised to avoid alcohol, sedatives and not to operate a motor vehicle while drowsy.  Untreated sleep apnea increases the risk for cardiovascular and neurovascular disease.

## 2024-05-02 NOTE — PROGRESS NOTES
[facility] Follow-up Telemedicine Visit   This visit was conducted via Zoom using secure and encrypted videoconferencing technology. The patient was in a private location in the state of [place]. The patient's identity was confirmed and verbal consent was obtained for this virtual visit. Given the importance of social distancing and other strategies recommended to reduce the risk of COVID-19 transmission, I am providing medical care to this patient via audio/video visit in place of an in person visit at the request of the patient. Verbal consent to telehealth, risks, benefits, and consequences were discussed. Patient retains the right to withdraw at any time. All existing confidentiality protections apply. The patient has access to all transmitted medical information. No dissemination of any patient images or information to other entities without further written consent.    Place of Service: Home    Subjective:     Date of Visit: 5/3/2024     CC:  Follow-up for JJ management      HPI:  Jovany Castillo is a very pleasant 73 y.o. year old male former smoker (18 pack-years, quit in 1981), with a PMHx of JJ, emphysema, chronic respiratory failure on 2.5-3 LPM 24/7 DSL, HTN who presented to the Sleep Clinic for a regular follow up. Last seen in the office on 3/25/2024 with Dr. Fletcher.       Patient presents for sleep study results.  Patient states that he does have gasping at night.  He denies any significant morning headaches, daytime drowsiness, drowsiness while driving, issues falling asleep, palpitations, snoring, apneas, dry mouth.  Patient does bed 11 PM and wakes up at 7 AM.  He will have 1 awakening at night to use the bathroom but would not have any issues when asleep.  He will occasionally nap for 15-45 minutes.      Sleep History:  PSG 4/10/2024-          ROS:  Constitutional: Denies fever, chills, sweats,  weight loss, fatigue  Cardiovascular: Denies chest pain, tightness, palpitations, swelling in  legs/feet  Respiratory: Denies shortness of breath, cough, sputum, wheezing, painful breathing   Sleep: per HPI  Gastrointestinal: Denies  difficulty swallowing, nausea, abdominal pain, diarrhea, constipation, heartburn.  Musculoskeletal: Denies painful joints, sore muscles,     Patient Active Problem List    Diagnosis Date Noted    JJ on CPAP 2024    Nocturnal hypoxia 2024    Emphysema lung ([facility]) 2023    Pain in the chest 2023    Acute respiratory failure with hypoxia ([facility]) 2023    Lesion of bone of thoracic spine 2023    Dyslipidemia 2023    Primary hypertension 2023       Past Medical History:   Diagnosis Date    Back pain     Chickenpox     COPD (chronic obstructive pulmonary disease) ([facility])     Cough     Daytime sleepiness     Difficulty swallowing     [place] measles     Hearing difficulty     Hyperlipidemia     Hypertension     Mumps     Ringing in ears     Shortness of breath     Tonsillitis     Wears glasses     Wheezing         Past Surgical History:   Procedure Laterality Date    LAMINOTOMY         Family History   Problem Relation Age of Onset    Hypertension Mother     Stroke Mother     Hypertension Father         Massive heart attack    Sleep Apnea Brother         Son not brother       Social History     Socioeconomic History    Marital status:      Spouse name: Not on file    Number of children: Not on file    Years of education: Not on file    Highest education level: Not on file   Occupational History    Not on file   Tobacco Use    Smoking status: Former     Current packs/day: 0.00     Average packs/day: 1 pack/day for 18.0 years (18.0 ttl pk-yrs)     Types: Cigarettes     Start date: 1963     Quit date: 1981     Years since quittin.3     Passive exposure: Never    Smokeless tobacco: Never   Vaping Use    Vaping Use: Never used   Substance and Sexual Activity    Alcohol use: Yes     Alcohol/week: 3.0 - 3.6 oz     Types: 5 - 6 Cans  "of beer per week     Comment: On the weekends 3 a day. In the past a heavier drinker at ti    Drug use: Never    Sexual activity: Not on file   Other Topics Concern    Not on file   Social History Narrative    Not on file     Social Determinants of Health     Financial Resource Strain: Not on file   Food Insecurity: Not on file   Transportation Needs: Not on file   Physical Activity: Not on file   Stress: Not on file   Social Connections: Not on file   Intimate Partner Violence: Not on file   Housing Stability: Not on file       Current Outpatient Medications   Medication Sig Dispense Refill    fluticasone-salmeterol (ADVAIR DISKUS) 250-50 MCG/ACT AEROSOL POWDER, BREATH ACTIVATED Inhale 1 Puff 2 times a day. Rinse mouth after each use. 60 Each 5    lisinopril (PRINIVIL) 10 MG Tab Take 1 Tablet by mouth every day. 30 Tablet 2    pravastatin (PRAVACHOL) 40 MG tablet Take 40 mg by mouth every evening.      multivitamin Tab Take 1 Tablet by mouth every day.      Omega-3 Fatty Acids (FISH OIL) 1000 MG Cap capsule Take 1,000 mg by mouth 3 times a day with meals.      aspirin (ASA) 81 MG Chew Tab chewable tablet Chew 81 mg every day.      vitamin D3 (CHOLECALCIFEROL) 1000 Unit (25 mcg) Tab Take 1,000 Units by mouth every day.       No current facility-administered medications for this visit.        ALLERGIES: Patient has no known allergies.     Objective:   Vitals obtained by patient:  /70 (BP Location: Right arm, Patient Position: Sitting, BP Cuff Size: Adult)   Pulse (!) 53 Comment: per patient  Ht 1.702 m (5' 7\")   Wt 99.8 kg (220 lb) Comment: per patient  SpO2 95% Comment: per patient  BMI 34.46 kg/m²     PHYSICAL EXAM: Limited due to telemedicine visit     Constitutional: Alert, no distress, well-groomed.  Skin: No rashes in visible areas.  Eye: Round. Conjunctiva clear, lids normal. No icterus.   ENMT: Lips pink without lesions, good dentition, moist mucous membranes. Phonation normal.  Neck: No masses, no " thyromegaly. Moves freely without pain.  CV: Pulse as reported by patient  Respiratory: Unlabored respiratory effort, no cough or audible wheeze  Psych: Alert and oriented x3, normal affect and mood.       Assessment and Plan:     The medical record was reviewed.    Diagnostic and titration nocturnal polysomnogram's, home sleep apnea tests, continuous nocturnal oximetry results, multiple sleep latency tests, and compliance reports reviewed.    Problem List Items Addressed This Visit          Pulmonary/Sleep Medicine Problems    JJ on CPAP     Sleep Apnea:    The pathophysiology of sleep anea and the increased risk of cardiovascular morbidity from untreated sleep apnea is discussed in detail with the patient.  Urged to avoid supine sleep, weight gain and alcoholic beverages since all of these can worsen sleep apnea. Cautioned against drowsy driving. If feeling sleepy while driving, pull over for a break/nap, rather than persist on the road, in the interest of own safety and that of others on the road.    PSG was reviewed and discussed with the patient.  We spent a significant mount time defining sleep apnea, different treatment options, process of obtaining a CPAP machine, risks of untreated sleep apnea, and follow-up parameters.     - Order placed for CPAP @ 9 CWP w/ 2LPM  - Compliance was reinforced  - Recommended the patient against the use of Ozone , such as SoClean  - Clean supplies a couple times a week with dish soap and water and air dry  - Recommended the patient change out supplies as recommended for best mask fit and usage of the machine  - Advised patient to reach out via MyChart if any questions or concerns should arise.   - Equipment replacement schedule:  Mask cushion every month  Nasal pillows 2 times per month  Mask every 6 months  Head gear every 6 months  Tubing every 3 months  Ultra-fine filters 2 times per month  Foam filter every 6 months  Humidifier chamber every 6 months  Chin strap  every 6 months    Has been advised to continue the current CPAP w/ 2LPM, clean equipment frequently, and get new mask and supplies as allowed by insurance and DME. Recommend an earlier appointment, if significant treatment barriers develop.    The risks of untreated sleep apnea were discussed with the patient at length. Patients with sleep apnea are at increased risk of cardiovascular disease including coronary artery disease, systemic arterial hypertension, pulmonary arterial hypertension, cardiac arrythmias, and stroke. The patient was advised to avoid driving a motor vehicle when drowsy.    Positive airway pressure will favorably impact many of the adverse conditions and effects provoked by sleep apnea.           Relevant Orders    DME CPAP    DME O2 New Set Up       Other    Nocturnal hypoxia     As above.          Relevant Orders    DME O2 New Set Up     Have advised the patient to follow up with the appropriate healthcare practitioners for all other medical problems and issues.    Return in about 3 months (around 8/3/2024), or if symptoms worsen or fail to improve, for 1st compliance, with Kayla.    Face to Face Video Visit:   Please note portions of this record was created using voice recognition software. I have made every reasonable attempt to correct obvious errors, but I expect that there are errors of grammar and possibly content I did not discover before finalizing the note.    Time spent in record review prior to patient arrival, reviewing results, and in face-to-face encounter totaled 20 min.  __________  JOSE Bean  Pulmonary & Sleep Medicine  [facility]

## 2024-05-03 ENCOUNTER — TELEMEDICINE (OUTPATIENT)
Dept: SLEEP MEDICINE | Facility: MEDICAL CENTER | Age: 74
End: 2024-05-03
Payer: MEDICARE

## 2024-05-03 VITALS
BODY MASS INDEX: 34.53 KG/M2 | OXYGEN SATURATION: 95 % | WEIGHT: 220 LBS | DIASTOLIC BLOOD PRESSURE: 70 MMHG | HEIGHT: 67 IN | HEART RATE: 53 BPM | SYSTOLIC BLOOD PRESSURE: 123 MMHG

## 2024-05-03 DIAGNOSIS — G47.34 NOCTURNAL HYPOXIA: ICD-10-CM

## 2024-05-03 DIAGNOSIS — G47.33 OSA ON CPAP: ICD-10-CM

## 2024-05-03 PROCEDURE — 99213 OFFICE O/P EST LOW 20 MIN: CPT | Mod: 95

## 2024-05-03 ASSESSMENT — FIBROSIS 4 INDEX: FIB4 SCORE: 1.36

## 2024-05-03 NOTE — ASSESSMENT & PLAN NOTE
Sleep Apnea:    The pathophysiology of sleep anea and the increased risk of cardiovascular morbidity from untreated sleep apnea is discussed in detail with the patient.  Urged to avoid supine sleep, weight gain and alcoholic beverages since all of these can worsen sleep apnea. Cautioned against drowsy driving. If feeling sleepy while driving, pull over for a break/nap, rather than persist on the road, in the interest of own safety and that of others on the road.    PSG was reviewed and discussed with the patient.  We spent a significant mount time defining sleep apnea, different treatment options, process of obtaining a CPAP machine, risks of untreated sleep apnea, and follow-up parameters.     - Order placed for CPAP @ 9 CWP w/ 2LPM  - Compliance was reinforced  - Recommended the patient against the use of Ozone , such as SoClean  - Clean supplies a couple times a week with dish soap and water and air dry  - Recommended the patient change out supplies as recommended for best mask fit and usage of the machine  - Advised patient to reach out via MyChart if any questions or concerns should arise.   - Equipment replacement schedule:  Mask cushion every month  Nasal pillows 2 times per month  Mask every 6 months  Head gear every 6 months  Tubing every 3 months  Ultra-fine filters 2 times per month  Foam filter every 6 months  Humidifier chamber every 6 months  Chin strap every 6 months    Has been advised to continue the current CPAP w/ 2LPM, clean equipment frequently, and get new mask and supplies as allowed by insurance and DME. Recommend an earlier appointment, if significant treatment barriers develop.    The risks of untreated sleep apnea were discussed with the patient at length. Patients with sleep apnea are at increased risk of cardiovascular disease including coronary artery disease, systemic arterial hypertension, pulmonary arterial hypertension, cardiac arrythmias, and stroke. The patient was advised  to avoid driving a motor vehicle when drowsy.    Positive airway pressure will favorably impact many of the adverse conditions and effects provoked by sleep apnea.

## 2024-05-03 NOTE — PATIENT INSTRUCTIONS
Please make sure that you are seen within 90 days of receiving your machine, with at least 30 days of use.  Please call scheduling at [phone] if your appointment needs to be moved to meet these parameters.     To meet compliance requirements for insurance please ensure that you use the machine at least 6/7 days a week for at least 4 or more hours a night.    I advise patients to research different mask options before picking up the new machine.  You should also ask what the [employer] company's return policy is for the mask because if you do not like the mask and don't return it within that time frame, you will have to wait 6 months for insurance to cover another mask.     Please bring your entire machine and chip to your first appointment, regardless if the machine is set up for wireless access.    Please do not return the machine without discussing any issues with a sleep provider, as you would be forfeiting therapy and would have to restart the testing process over.

## 2024-07-26 NOTE — PROGRESS NOTES
[facility] Follow-up Visit    Date of Visit: 8/1/2024     CC:  Follow-up for JJ management      HPI:  Jovany Castillo is a very pleasant 73 y.o. year old male former smoker (18 pack-years, quit in 1981), with a PMHx of JJ, emphysema, chronic respiratory failure on 2.5-3 LPM 24/7 DSL, HTN who [facility] to the [facility] for a regular follow up. Last seen in the office on 5/3/2024 with Dr. Fletcher.      Patient presents for this compliance.    Patient presents for ***.     Sleep more restful with CPAP usage    Denies morning headaches.    Denies daytime drowsiness / driving drowsy.     Denies any issues falling asleep.      Snoring / Gasping / Apneas    Palpitations    Dry mouth    Aerophagia    Mask leak / Skin irritation    Goes to bed:  Wakes up:  Naps (frequency and duration):  Awakenings:  Issues falling back to sleep?        DME provider: ***  Device: ***  Settings:***  Oxygen: ***  When:***  Mask: ***  Chin strap: {YES/NO:20266}    Cleaning regimen: ***    Compliance:  Compliance data reviewed showing ***% usage > 4hours in last 30 *** days. Average AHI *** events/hour. 95% pressure *** CWP. 95% leaks *** L/min. Patient continues to use and benefit from machine. ***           Sleep History:  PSG 4/10/2024-        Patient Active Problem List    Diagnosis Date Noted    JJ on CPAP 02/19/2024    Nocturnal hypoxia 05/03/2024    Emphysema lung (HCC) 11/14/2023    Pain in the chest 11/12/2023    Acute respiratory failure with hypoxia (HCC) 11/12/2023    Lesion of bone of thoracic spine 11/12/2023    Dyslipidemia 11/12/2023    Primary hypertension 11/12/2023     Past Medical History:   Diagnosis Date    Back pain     Chickenpox     COPD (chronic obstructive pulmonary disease) ([facility])     Cough     Daytime sleepiness     Difficulty swallowing     Haitian measles     Hearing difficulty     Hyperlipidemia     Hypertension     Mumps     Ringing in ears     Shortness of breath     Tonsillitis     Wears glasses      Wheezing       Past Surgical History:   Procedure Laterality Date    LAMINOTOMY       Family History   Problem Relation Age of Onset    Hypertension Mother     Stroke Mother     Hypertension Father         Massive heart attack    Sleep Apnea Brother         Son not brother     Social History     Socioeconomic History    Marital status:      Spouse name: Not on file    Number of children: Not on file    Years of education: Not on file    Highest education level: Not on file   Occupational History    Not on file   Tobacco Use    Smoking status: Former     Current packs/day: 0.00     Average packs/day: 1 pack/day for 18.0 years (18.0 ttl pk-yrs)     Types: Cigarettes     Start date: 1963     Quit date: 1981     Years since quittin.5     Passive exposure: Never    Smokeless tobacco: Never   Vaping Use    Vaping status: Never Used   Substance and Sexual Activity    Alcohol use: Yes     Alcohol/week: 3.0 - 3.6 oz     Types: 5 - 6 Cans of beer per week     Comment: On the weekends 3 a day. In the past a heavier drinker at ti    Drug use: Never    Sexual activity: Not on file   Other Topics Concern    Not on file   Social History Narrative    Not on file     Social Determinants of Health     Financial Resource Strain: Not on File (2024)    Received from [facility]    Financial Resource Strain     Financial Resource Strain: 0   Food Insecurity: Not on File (2024)    Received from [facility]    Food Insecurity     Food: 0   Transportation Needs: Not on File (2024)    Received from [facility]    Transportation Needs     Transportation: 0   Physical Activity: Not on File (2024)    Received from [facility]    Physical Activity     Physical Activity: 0   Stress: Not on File (2024)    Received from [facility]    Stress     Stress: 0   Social Connections: Not on File (2024)    Received from [facility]    Social Connections     Social Connections and Isolation: 0   Intimate Partner Violence: Not on file    Housing Stability: Not on File (2024)    Received from [facility]     [place]     Current Outpatient Medications   Medication Sig Dispense Refill    fluticasone-salmeterol (ADVAIR DISKUS) 250-50 MCG/ACT AEROSOL POWDER, BREATH ACTIVATED Inhale 1 Puff 2 times a day. Rinse mouth after each use. 60 Each 5    lisinopril (PRINIVIL) 10 MG Tab Take 1 Tablet by mouth every day. 30 Tablet 2    pravastatin (PRAVACHOL) 40 MG tablet Take 40 mg by mouth every evening.      multivitamin Tab Take 1 Tablet by mouth every day.      Omega-3 Fatty Acids (FISH OIL) 1000 MG Cap capsule Take 1,000 mg by mouth 3 times a day with meals.      aspirin (ASA) 81 MG Chew Tab chewable tablet Chew 81 mg every day.      vitamin D3 (CHOLECALCIFEROL) 1000 Unit (25 mcg) Tab Take 1,000 Units by mouth every day.       No current facility-administered medications for this visit.      ALLERGIES: Patient has no known allergies.    ROS:  Constitutional: Denies fever, chills, sweats,  weight loss, fatigue  Cardiovascular: Denies chest pain, tightness, palpitations, swelling in legs/feet  Respiratory: Denies shortness of breath, cough, sputum, wheezing, painful breathing   Sleep: per HPI  Gastrointestinal: Denies  difficulty swallowing, nausea, abdominal pain, diarrhea, constipation, heartburn.  Musculoskeletal: Denies painful joints, sore muscles,       PHYSICAL EXAM:  There were no vitals taken for this visit.  Appearance: Well-nourished, well-developed, no acute distress  Eyes:  No scleral icterus , EOMI  ENMT: No redness of the oropharynx***  Lung auscultation:  No wheezes rhonchi rubs or rales***  Cardiac: No murmurs, rubs, or gallops; regular rhythm, normal rate; no edema***  Musculoskeletal:  Grossly normal; gait and station normal; digits and nails normal  Skin:  No rashes, petechiae, cyanosis  Neurologic: without focal signs; oriented to person, time, place, and purpose; judgement intact  Psychiatric:  No depression,  anxiety, agitation  Mallampati score: Class ***    Assessment and Plan:    The medical record was reviewed.    Diagnostic and titration nocturnal polysomnogram's, home sleep apnea tests, continuous nocturnal oximetry results, multiple sleep latency tests, and compliance reports reviewed.    Problem List Items Addressed This Visit    None    Have advised the patient to follow up with the appropriate healthcare practitioners for all other medical problems and issues.    No follow-ups on file.    Please note portions of this record was created using voice recognition software. I have made every reasonable attempt to correct obvious errors, but I expect that there are errors of grammar and possibly content I did not discover before finalizing the note.    Time spent in record review prior to patient arrival, reviewing results, and in face-to-face encounter totaled *** min.  __________  JOSE Bean  Pulmonary & Sleep Medicine  [facility]      of the machine  - Equipment replacement schedule:  Mask cushion every month  Nasal pillows 2 times per month  Mask every 6 months  Head gear every 6 months  Tubing every 3 months  Ultra-fine filters 2 times per month  Foam filter every 6 months  Humidifier chamber every 6 months  Chin strap every 6 months    Has been advised to continue the current CPAP, clean equipment frequently, and get new mask and supplies as allowed by insurance and DME. Recommend an earlier appointment, if significant treatment barriers develop.    Advised patient to reach out via [employer] if any questions or concerns should arise.          Relevant Orders    DME Mask and Supplies     Have advised the patient to follow up with the appropriate healthcare practitioners for all other medical problems and issues.    Return in about 1 year (around 8/1/2025), or if symptoms worsen or fail to improve, for compliance, with [facility].    Please note portions of this record was created using voice recognition software. I have made every reasonable attempt to correct obvious errors, but I expect that there are errors of grammar and possibly content I did not discover before finalizing the note.    Time spent in record review prior to patient arrival, reviewing results, and in face-to-face encounter totaled 20 min.  __________  JOSE Bean  Pulmonary & Sleep Medicine  [facility]

## 2024-08-01 ENCOUNTER — APPOINTMENT (OUTPATIENT)
Dept: SLEEP MEDICINE | Facility: MEDICAL CENTER | Age: 74
End: 2024-08-01
Payer: MEDICARE

## 2024-08-01 VITALS
DIASTOLIC BLOOD PRESSURE: 62 MMHG | HEIGHT: 68 IN | WEIGHT: 225 LBS | OXYGEN SATURATION: 92 % | BODY MASS INDEX: 34.1 KG/M2 | HEART RATE: 65 BPM | SYSTOLIC BLOOD PRESSURE: 108 MMHG

## 2024-08-01 DIAGNOSIS — G47.33 OSA ON CPAP: ICD-10-CM

## 2024-08-01 PROCEDURE — 99213 OFFICE O/P EST LOW 20 MIN: CPT

## 2024-08-01 PROCEDURE — 3078F DIAST BP <80 MM HG: CPT

## 2024-08-01 PROCEDURE — 3074F SYST BP LT 130 MM HG: CPT

## 2024-08-01 ASSESSMENT — FIBROSIS 4 INDEX: FIB4 SCORE: 1.36

## 2024-08-01 NOTE — PATIENT INSTRUCTIONS
Please make sure that you are seen within 90 days of receiving your machine, with at least 30 days of use.  Please call scheduling at [phone] if your appointment needs to be moved to meet these parameters.     To meet compliance requirements for insurance please ensure that you use the machine at least 6/7 days a week for at least 4 or more hours a night.    I advise patients to research different mask options before picking up the new machine.  You should also ask what the [employer] company's return policy is for the mask because if you do not like the mask and don't return it within that time frame, you will have to wait 6 months for insurance to cover another mask.     Please bring your entire machine and chip to your first appointment, regardless if the machine is set up for wireless access.    Please do not return the machine without discussing any issues with a sleep provider, as you would be forfeiting therapy and would have to restart the testing process over.

## 2024-08-01 NOTE — ASSESSMENT & PLAN NOTE
Sleep Apnea:    The pathophysiology of sleep anea and the increased risk of cardiovascular morbidity from untreated sleep apnea is discussed in detail with the patient.  Urged to avoid supine sleep, weight gain and alcoholic beverages since all of these can worsen sleep apnea. Cautioned against drowsy driving. If feeling sleepy while driving, pull over for a break/nap, rather than persist on the road, in the interest of own safety and that of others on the road.  The risks of untreated sleep apnea were discussed with the patient at length. Patients with sleep apnea are at increased risk of cardiovascular disease including coronary artery disease, systemic arterial hypertension, pulmonary arterial hypertension, cardiac arrythmias, and stroke.  Positive airway pressure will favorably impact many of the adverse conditions and effects provoked by sleep apnea.    Plan:    Compliance download was reviewed and discussed with the patient.   Patient is compliant and AHI is well-controlled.  Will see him back in 1 year for annual compliance.    - Order placed for mask and supplies to [facility]   - Compliance was reinforced  - Clean supplies a least once a week with dish soap and water and air dry  - Recommended the patient against the use of Ozone , such as SoClean  - Recommended the patient change out supplies as recommended for best mask fit and usage of the machine  - Equipment replacement schedule:  Mask cushion every month  Nasal pillows 2 times per month  Mask every 6 months  Head gear every 6 months  Tubing every 3 months  Ultra-fine filters 2 times per month  Foam filter every 6 months  Humidifier chamber every 6 months  Chin strap every 6 months    Has been advised to continue the current CPAP, clean equipment frequently, and get new mask and supplies as allowed by insurance and DME. Recommend an earlier appointment, if significant treatment barriers develop.    Advised patient to reach out via [employer] if any  questions or concerns should arise.